# Patient Record
Sex: FEMALE | Race: WHITE | Employment: FULL TIME | URBAN - METROPOLITAN AREA
[De-identification: names, ages, dates, MRNs, and addresses within clinical notes are randomized per-mention and may not be internally consistent; named-entity substitution may affect disease eponyms.]

---

## 2018-09-26 ENCOUNTER — OFFICE VISIT (OUTPATIENT)
Dept: URGENT CARE | Age: 28
End: 2018-09-26

## 2018-09-26 VITALS
OXYGEN SATURATION: 98 % | HEIGHT: 64 IN | DIASTOLIC BLOOD PRESSURE: 66 MMHG | SYSTOLIC BLOOD PRESSURE: 119 MMHG | HEART RATE: 66 BPM | WEIGHT: 151 LBS | TEMPERATURE: 98.5 F | BODY MASS INDEX: 25.78 KG/M2 | RESPIRATION RATE: 18 BRPM

## 2018-09-26 DIAGNOSIS — L03.317 CELLULITIS OF BUTTOCK: Primary | ICD-10-CM

## 2018-09-26 RX ORDER — CLINDAMYCIN PHOSPHATE 150 MG/ML
300 INJECTION, SOLUTION INTRAVENOUS
Status: COMPLETED | OUTPATIENT
Start: 2018-09-26 | End: 2018-09-26

## 2018-09-26 RX ORDER — AMOXICILLIN AND CLAVULANATE POTASSIUM 875; 125 MG/1; MG/1
1 TABLET, FILM COATED ORAL 2 TIMES DAILY
Qty: 20 TAB | Refills: 0 | Status: SHIPPED | OUTPATIENT
Start: 2018-09-26 | End: 2018-09-28 | Stop reason: ALTCHOICE

## 2018-09-26 RX ADMIN — CLINDAMYCIN PHOSPHATE 300 MG: 150 INJECTION, SOLUTION INTRAVENOUS at 09:18

## 2018-09-26 NOTE — MR AVS SNAPSHOT
[facility] [address] [place] [address] 
[phone] Patient: Radha Suero MRN: [id number] :1990 Visit Information Date & Time Provider Department Dept. Phone Encounter #  
 2018  8:45 AM [facility] [phone] [id number] Upcoming Health Maintenance Date Due DTaP/Tdap/Td series (1 - Tdap) 10/29/2011 PAP AKA CERVICAL CYTOLOGY 10/29/2011 Influenza Age 5 to Adult 2018 Allergies as of 2018  Review Complete On: 2018 By: Kenya Valentin RN No Known Allergies Current Immunizations  Never Reviewed No immunizations on file. Not reviewed this visit You Were Diagnosed With   
  
 Codes Comments Cellulitis of buttock    -  Primary ICD-10-CM: L03.317 ICD-9-CM: 682.5 Vitals BP Pulse Temp Resp Height(growth percentile) Weight(growth percentile)  
 119/66 66 98.5 °F (36.9 °C) 18 5' 4\" (1.626 m) 151 lb (68.5 kg) LMP SpO2 BMI OB Status Smoking Status 2018 (Exact Date) 98% 25.92 kg/m2 Unknown Current Every Day Smoker BMI and BSA Data Body Mass Index Body Surface Area  
 25.92 kg/m 2 1.76 m 2 Preferred Pharmacy Pharmacy Name Phone [id number] [address] [address] [place] [address] [phone] Your Updated Medication List  
  
   
This list is accurate as of 18  9:11 AM.  Always use your most recent med list.  
  
  
  
  
 amoxicillin-clavulanate 875-125 mg per tablet Commonly known as:  AUGMENTIN Take 1 Tab by mouth two (2) times a day for 10 days. ATIVAN PO Take  by mouth. Prescriptions Sent to Pharmacy Refills  
 amoxicillin-clavulanate (AUGMENTIN) 875-125 mg per tablet 0 Sig: Take 1 Tab by mouth two (2) times a day for 10 days.   
 Class: Normal  
 Pharmacy: [id number] [place], [place] [employer]  #: [phone] Route: Oral  
  
Patient Instructions Cellulitis: Care Instructions Your Care Instructions Cellulitis is a skin infection caused by bacteria, most often strep or staph. It often occurs after a break in the skin from a scrape, cut, bite, or puncture, or after a rash. Cellulitis may be treated without doing tests to find out what caused it. But your doctor may do tests, if needed, to look for a specific bacteria, like methicillin-resistant Staphylococcus aureus (MRSA). The doctor has checked you carefully, but problems can develop later. If you notice any problems or new symptoms, get medical treatment right away. Follow-up care is a key part of your treatment and safety. Be sure to make and go to all appointments, and call your doctor if you are having problems. It's also a good idea to know your test results and keep a list of the medicines you take. How can you care for yourself at home? · Take your antibiotics as directed. Do not stop taking them just because you feel better. You need to take the full course of antibiotics. · Prop up the infected area on pillows to reduce pain and swelling. Try to keep the area above the level of your heart as often as you can. · If your doctor told you how to care for your wound, follow your doctor's instructions. If you did not get instructions, follow this general advice: ¨ Wash the wound with clean water 2 times a day. Don't use hydrogen peroxide or alcohol, which can slow healing. ¨ You may cover the wound with a thin layer of petroleum jelly, such as Vaseline, and a nonstick bandage. ¨ Apply more petroleum jelly and replace the bandage as needed. · Be safe with medicines. Take pain medicines exactly as directed. ¨ If the doctor gave you a prescription medicine for pain, take it as prescribed. ¨ If you are not taking a prescription pain medicine, ask your doctor if you can take an over-the-counter medicine. To prevent cellulitis in the future · Try to prevent cuts, scrapes, or other injuries to your skin. Cellulitis most often occurs where there is a break in the skin. · If you get a scrape, cut, mild burn, or bite, wash the wound with clean water as soon as you can to help avoid infection. Don't use hydrogen peroxide or alcohol, which can slow healing. · If you have swelling in your legs (edema), support stockings and good skin care may help prevent leg sores and cellulitis. · Take care of your feet, especially if you have diabetes or other conditions that increase the risk of infection. Wear shoes and socks. Do not go barefoot. If you have athlete's foot or other skin problems on your feet, talk to your doctor about how to treat them. When should you call for help? Call your doctor now or seek immediate medical care if: 
  · You have signs that your infection is getting worse, such as: 
¨ Increased pain, swelling, warmth, or redness. ¨ Red streaks leading from the area. ¨ Pus draining from the area. ¨ A fever.  
  · You get a rash.  
 Watch closely for changes in your health, and be sure to contact your doctor if: 
  · You do not get better as expected. Where can you learn more? Go to http://adarsh-juan miguel.info/. Phoenix Malik in the search box to learn more about \"Cellulitis: Care Instructions. \" Current as of: May 10, 2017 Content Version: 11.7 © 9142-4186 [employer]. Care instructions adapted under license by [employer] (which disclaims liability or warranty for this information). If you have questions about a medical condition or this instruction, always ask your healthcare professional. [place] [address] any warranty or liability for your use of this information. Introducing [facility]!    
 Virgil Jaimes introduces [employer] patient portal. Now you can access parts of your medical record, email your doctor's office, and request medication refills online. 1. In your internet browser, go to [email or web address] 2. Click on the First Time User? Click Here link in the Sign In box. You will see the New Member Sign Up page. 3. Enter your CollabNet Access Code exactly as it appears below. You will not need to use this code after youve completed the sign-up process. If you do not sign up before the expiration date, you must request a new code. · CollabNet Access Code: [email or web address] 
Expires: 12/25/2018  8:48 AM 
 
4. Enter the last four digits of your Social Security Number (xxxx) and Date of Birth (mm/dd/yyyy) as indicated and click Submit. You will be taken to the next sign-up page. 5. Create a CollabNet ID. This will be your CollabNet login ID and cannot be changed, so think of one that is secure and easy to remember. 6. Create a CollabNet password. You can change your password at any time. 7. Enter your Password Reset Question and Answer. This can be used at a later time if you forget your password. 8. Enter your e-mail address. You will receive e-mail notification when new information is available in [address] 9. Click Sign Up. You can now view and download portions of your medical record. 10. Click the Download Summary menu link to download a portable copy of your medical information. If you have questions, please visit the Frequently Asked Questions section of the CollabNet website. Remember, CollabNet is NOT to be used for urgent needs. For medical emergencies, dial 911. Now available from your iPhone and Android! Please provide this summary of care documentation to your next provider. If you have any questions after today's visit, please call [phone].

## 2018-09-26 NOTE — PATIENT INSTRUCTIONS
Cellulitis: Care Instructions  Your Care Instructions    Cellulitis is a skin infection caused by bacteria, most often strep or staph. It often occurs after a break in the skin from a scrape, cut, bite, or puncture, or after a rash. Cellulitis may be treated without doing tests to find out what caused it. But your doctor may do tests, if needed, to look for a specific bacteria, like methicillin-resistant Staphylococcus aureus (MRSA). The doctor has checked you carefully, but problems can develop later. If you notice any problems or new symptoms, get medical treatment right away. Follow-up care is a key part of your treatment and safety. Be sure to make and go to all appointments, and call your doctor if you are having problems. It's also a good idea to know your test results and keep a list of the medicines you take. How can you care for yourself at home? · Take your antibiotics as directed. Do not stop taking them just because you feel better. You need to take the full course of antibiotics. · Prop up the infected area on pillows to reduce pain and swelling. Try to keep the area above the level of your heart as often as you can. · If your doctor told you how to care for your wound, follow your doctor's instructions. If you did not get instructions, follow this general advice:  ¨ Wash the wound with clean water 2 times a day. Don't use hydrogen peroxide or alcohol, which can slow healing. ¨ You may cover the wound with a thin layer of petroleum jelly, such as Vaseline, and a nonstick bandage. ¨ Apply more petroleum jelly and replace the bandage as needed. · Be safe with medicines. Take pain medicines exactly as directed. ¨ If the doctor gave you a prescription medicine for pain, take it as prescribed. ¨ If you are not taking a prescription pain medicine, ask your doctor if you can take an over-the-counter medicine.   To prevent cellulitis in the future  · Try to prevent cuts, scrapes, or other injuries to your skin. Cellulitis most often occurs where there is a break in the skin. · If you get a scrape, cut, mild burn, or bite, wash the wound with clean water as soon as you can to help avoid infection. Don't use hydrogen peroxide or alcohol, which can slow healing. · If you have swelling in your legs (edema), support stockings and good skin care may help prevent leg sores and cellulitis. · Take care of your feet, especially if you have diabetes or other conditions that increase the risk of infection. Wear shoes and socks. Do not go barefoot. If you have athlete's foot or other skin problems on your feet, talk to your doctor about how to treat them. When should you call for help? Call your doctor now or seek immediate medical care if:    · You have signs that your infection is getting worse, such as:  ¨ Increased pain, swelling, warmth, or redness. ¨ Red streaks leading from the area. ¨ Pus draining from the area. ¨ A fever.     · You get a rash.    Watch closely for changes in your health, and be sure to contact your doctor if:    · You do not get better as expected. Where can you learn more? Go to http://adarsh-juan miguel.info/. Gina Richmond in the search box to learn more about \"Cellulitis: Care Instructions. \"  Current as of: May 10, 2017  Content Version: 11.7  © 3716-6374 [employer]. Care instructions adapted under license by [employer] (which disclaims liability or warranty for this information). If you have questions about a medical condition or this instruction, always ask your healthcare professional. [place] [address] any warranty or liability for your use of this information.

## 2018-09-26 NOTE — LETTER
[address] [place] [address] 
[phone] Work/School Note Date: 9/26/2018 To Whom It May concern: 
 
Mario Alberto Durant was seen and treated today in the urgent care center. Mario Alberto Durant may return to work on 9/27/2018. Sincerely, Zan Dillard MD

## 2018-09-26 NOTE — PROGRESS NOTES
Patient is a 32 y.o. female presenting with rash. Rash    The history is provided by the patient. This is a recurrent (hx of recurrent pilonidal infections, never had I&D) problem. Episode onset: 5 days ago. The problem has been gradually worsening. There has been no fever. Affected Location: upper crease of buttocks. The pain is at a severity of 4/10. The pain is mild. The pain has been constant since onset. Associated symptoms include pain. She has tried nothing for the symptoms. No past medical history on file. No past surgical history on file. No family history on file. Social History     Social History    Marital status: SINGLE     Spouse name: N/A    Number of children: N/A    Years of education: N/A     Occupational History    Not on file. Social History Main Topics    Smoking status: Current Every Day Smoker    Smokeless tobacco: Never Used    Alcohol use Not on file    Drug use: Not on file    Sexual activity: Not on file     Other Topics Concern    Not on file     Social History Narrative    No narrative on file                ALLERGIES: Review of patient's allergies indicates no known allergies. Review of Systems   Constitutional: Negative for chills and fever. Respiratory: Negative for shortness of breath and wheezing. Cardiovascular: Negative for chest pain and palpitations. Musculoskeletal: Negative for myalgias. Skin: Positive for color change and rash. Hematological: Negative for adenopathy. Vitals:    09/26/18 0901   BP: 119/66   Pulse: 66   Resp: 18   Temp: 98.5 °F (36.9 °C)   SpO2: 98%   Weight: 151 lb (68.5 kg)   Height: 5' 4\" (1.626 m)       Physical Exam   Constitutional: She appears well-developed and well-nourished. No distress. Neurological: She is alert. Skin: She is not diaphoretic. There is erythema. Psychiatric: She has a normal mood and affect.  Her behavior is normal. Judgment and thought content normal.   Nursing note and vitals reviewed. [facility]    ICD-10-CM ICD-9-CM    1. Cellulitis of buttock L03.317 682.5      Medications Ordered Today   Medications    clindamycin phosphate (CLEOCIN) 150 mg/mL injection 300 mg     Order Specific Question:   Antibiotic Indications     Answer:   Skin and Soft Tissue Infection     Order Specific Question:   Skin duration of therapy     Answer: Other     Comments:   now    amoxicillin-clavulanate (AUGMENTIN) 875-125 mg per tablet     Sig: Take 1 Tab by mouth two (2) times a day for 10 days. Dispense:  20 Tab     Refill:  0     Warm compresses    The patients condition was discussed with the patient and they understand. The patient is to follow up with PCP. If signs and symptoms become worse the pt is to go to the ER. The patient is to take medications as prescribed.              Procedures

## 2018-09-28 ENCOUNTER — OFFICE VISIT (OUTPATIENT)
Dept: URGENT CARE | Age: 28
End: 2018-09-28

## 2018-09-28 VITALS
SYSTOLIC BLOOD PRESSURE: 106 MMHG | DIASTOLIC BLOOD PRESSURE: 65 MMHG | OXYGEN SATURATION: 98 % | BODY MASS INDEX: 25.78 KG/M2 | HEART RATE: 70 BPM | RESPIRATION RATE: 16 BRPM | HEIGHT: 64 IN | TEMPERATURE: 97.3 F | WEIGHT: 151 LBS

## 2018-09-28 DIAGNOSIS — L05.01 PILONIDAL CYST WITH ABSCESS: Primary | ICD-10-CM

## 2018-09-28 RX ORDER — CLINDAMYCIN HYDROCHLORIDE 300 MG/1
300 CAPSULE ORAL 3 TIMES DAILY
Qty: 30 CAP | Refills: 0 | Status: SHIPPED | OUTPATIENT
Start: 2018-09-28 | End: 2018-10-08

## 2018-09-28 NOTE — PROGRESS NOTES
HPI Comments: Werner Schneider presents with worsening buttock redness, pain, and swelling for the past 7 days. Was seen here 2 days ago, placed on Augmentin for buttock cellulitis without improvement. Denies fever, chills, drainage. Has hx of recurrent pilonidal infections, that typically self resolve. Sister also has them. The history is provided by the patient. History reviewed. No pertinent past medical history. History reviewed. No pertinent surgical history. History reviewed. No pertinent family history. Social History Social History  Marital status: SINGLE Spouse name: N/A  
 Number of children: N/A  
 Years of education: N/A Occupational History  Not on file. Social History Main Topics  Smoking status: Current Every Day Smoker  Smokeless tobacco: Never Used  Alcohol use Not on file  Drug use: Not on file  Sexual activity: Not on file Other Topics Concern  Not on file Social History Narrative ALLERGIES: Review of patient's allergies indicates no known allergies. Review of Systems Constitutional: Negative for chills and fever. Respiratory: Negative for shortness of breath and wheezing. Cardiovascular: Negative for chest pain and palpitations. Musculoskeletal: Negative for myalgias. Skin: Positive for color change. Hematological: Negative for adenopathy. Vitals:  
 09/28/18 2688 BP: 106/65 Pulse: 70 Resp: 16 Temp: 97.3 °F (36.3 °C) SpO2: 98% Weight: 151 lb (68.5 kg) Height: 5' 4\" (1.626 m) Physical Exam  
Constitutional: She appears well-developed and well-nourished. No distress. Neurological: She is alert. Skin: She is not diaphoretic. There is erythema. Psychiatric: She has a normal mood and affect. Her behavior is normal. Judgment and thought content normal.  
Nursing note and vitals reviewed. MDM 
  ICD-10-CM ICD-9-CM 1. Pilonidal cyst with abscess L05.01 685.0 AEROBIC BACTERIAL CULTURE Medications Ordered Today Medications  clindamycin (CLEOCIN) 300 mg capsule Sig: Take 1 Cap by mouth three (3) times daily for 10 days. Dispense:  30 Cap Refill:  0 Warm compresses The patients condition was discussed with the patient and they understand. The patient is to follow up with PCP. If signs and symptoms become worse the pt is to go to the ER. The patient is to take medications as prescribed. I&D Abcess Simple Consent: Verbal consent obtained. Consent given by: patient Date/Time: 9/28/2018 10:09 AM 
Performed by: attendingPreparation: skin prepped with alcohol Location details: right buttocks Anesthesia: local infiltration Anesthesia: 
Local Anesthetic: lidocaine 1% with epinephrine Anesthetic total: 1 mL Scalpel size: 11 Complexity: simple Drainage: purulent Drainage amount: scant Wound treatment: wound left open Post-procedure: dressing applied Patient tolerance: Patient tolerated the procedure well with no immediate complications My total time at bedside, performing this procedure was 1-15 minutes.

## 2018-09-28 NOTE — PATIENT INSTRUCTIONS
Pilonidal Abscess: Care Instructions Your Care Instructions A pilonidal abscess is an infection caused by an ingrown hair. The abscess occurs in the area of the tailbone and the top of the buttocks. The infection causes a pocket of pus to form. It can be quite painful. Your doctor may have opened and drained the abscess. You can take care of yourself at home to help the area heal. In some cases, the abscess returns. Your doctor may suggest surgery to remove the site of the infection if it comes back. You may have had a sedative to help you relax. You may be unsteady after having sedation. It can take a few hours for the medicine's effects to wear off. Common side effects of sedation include nausea, vomiting, and feeling sleepy or tired. The doctor has checked you carefully, but problems can develop later. If you notice any problems or new symptoms, get medical treatment right away. Follow-up care is a key part of your treatment and safety. Be sure to make and go to all appointments, and call your doctor if you are having problems. It's also a good idea to know your test results and keep a list of the medicines you take. How can you care for yourself at home? · If the doctor gave you a sedative: ¨ For 24 hours, don't do anything that requires attention to detail. It takes time for the medicine's effects to completely wear off. ¨ For your safety, do not drive or operate any machinery that could be dangerous. Wait until the medicine wears off and you can think clearly and react easily. · If your doctor prescribed antibiotics, take them exactly as directed. Do not stop taking them just because you feel better. You need to take the full course of antibiotics. · Be safe with medicines. Take pain medicines exactly as directed. ¨ If the doctor gave you a prescription medicine for pain, take it as prescribed.  
¨ If you are not taking a prescription pain medicine, ask your doctor if you can take an over-the-counter medicine. · If your doctor opened and drained your abscess, you may have gauze or other packing material inside your wound. Follow all instructions from your doctor on how to care for your wound. · Keep the area of your wound very clean. Use wet cotton balls, a warm washcloth, or baby wipes. Clean the area gently, especially after a bowel movement. When should you call for help? Call 911 anytime you think you may need emergency care. For example, call if: 
  · You have trouble breathing.  
  · You passed out (lost consciousness).  
 Call your doctor now or seek immediate medical care if: 
  · You have new or worse nausea or vomiting.  
  · You have symptoms of infection, such as: 
¨ Increased pain, swelling, warmth, or redness. ¨ Red streaks leading from the area. ¨ Pus draining from the area. ¨ A fever.  
 Watch closely for changes in your health, and be sure to contact your doctor if: 
  · You do not get better as expected. Where can you learn more? Go to http://adarsh-juan miguel.info/. Enter 22 [id number] in the search box to learn more about \"Pilonidal Abscess: Care Instructions. \" Current as of: October 5, 2017 Content Version: 11.7 © 4646-7126 [employer]. Care instructions adapted under license by [employer] (which disclaims liability or warranty for this information). If you have questions about a medical condition or this instruction, always ask your healthcare professional. [place] [address] any warranty or liability for your use of this information.

## 2018-09-28 NOTE — MR AVS SNAPSHOT
[address] [facility] [address] 
[phone] Patient: Jasmyn Lambert MRN: [id number] :1990 Visit Information Date & Time Provider Department Dept. Phone Encounter #  
 2018  9:00 AM [address] [facility] [phone] [id number] Upcoming Health Maintenance Date Due Pneumococcal 19-64 Medium Risk (1 of 1 - PPSV23) 10/29/2009 DTaP/Tdap/Td series (1 - Tdap) 10/29/2011 PAP AKA CERVICAL CYTOLOGY 10/29/2011 Influenza Age 5 to Adult 2018 Allergies as of 2018  Review Complete On: 2018 By: Ida Jhaveri RN No Known Allergies Current Immunizations  Never Reviewed No immunizations on file. Not reviewed this visit You Were Diagnosed With   
  
 Codes Comments Pilonidal cyst with abscess    -  Primary ICD-10-CM: L05.01 
ICD-9-CM: 385. 0 Vitals BP Pulse Temp Resp Height(growth percentile) Weight(growth percentile) 106/65 70 97.3 °F (36.3 °C) 16 5' 4\" (1.626 m) 151 lb (68.5 kg) SpO2 BMI OB Status Smoking Status 98% 25.92 kg/m2 Unknown Current Every Day Smoker BMI and BSA Data Body Mass Index Body Surface Area  
 25.92 kg/m 2 1.76 m 2 Preferred Pharmacy Pharmacy Name Phone [facility]/PHARMACY #[id number] [place] [address] [phone] Your Updated Medication List  
  
   
This list is accurate as of 18  9:47 AM.  Always use your most recent med list.  
  
  
  
  
 ATIVAN PO Take  by mouth. clindamycin 300 mg capsule Commonly known as:  CLEOCIN Take 1 Cap by mouth three (3) times daily for 10 days. Prescriptions Sent to Pharmacy Refills  
 clindamycin (CLEOCIN) 300 mg capsule 0 Sig: Take 1 Cap by mouth three (3) times daily for 10 days.   
 Class: Normal  
 Pharmacy: [facility]/pharmacy #[id number] [place] [place] [employer]  #: [phone] Route: Oral  
  
We Performed the Following AEROBIC BACTERIAL CULTURE [id number] CPT(R)] Patient Instructions Pilonidal Abscess: Care Instructions Your Care Instructions A pilonidal abscess is an infection caused by an ingrown hair. The abscess occurs in the area of the tailbone and the top of the buttocks. The infection causes a pocket of pus to form. It can be quite painful. Your doctor may have opened and drained the abscess. You can take care of yourself at home to help the area heal. In some cases, the abscess returns. Your doctor may suggest surgery to remove the site of the infection if it comes back. You may have had a sedative to help you relax. You may be unsteady after having sedation. It can take a few hours for the medicine's effects to wear off. Common side effects of sedation include nausea, vomiting, and feeling sleepy or tired. The doctor has checked you carefully, but problems can develop later. If you notice any problems or new symptoms, get medical treatment right away. Follow-up care is a key part of your treatment and safety. Be sure to make and go to all appointments, and call your doctor if you are having problems. It's also a good idea to know your test results and keep a list of the medicines you take. How can you care for yourself at home? · If the doctor gave you a sedative: ¨ For 24 hours, don't do anything that requires attention to detail. It takes time for the medicine's effects to completely wear off. ¨ For your safety, do not drive or operate any machinery that could be dangerous. Wait until the medicine wears off and you can think clearly and react easily. · If your doctor prescribed antibiotics, take them exactly as directed. Do not stop taking them just because you feel better. You need to take the full course of antibiotics. · Be safe with medicines. Take pain medicines exactly as directed. ¨ If the doctor gave you a prescription medicine for pain, take it as prescribed. ¨ If you are not taking a prescription pain medicine, ask your doctor if you can take an over-the-counter medicine. · If your doctor opened and drained your abscess, you may have gauze or other packing material inside your wound. Follow all instructions from your doctor on how to care for your wound. · Keep the area of your wound very clean. Use wet cotton balls, a warm washcloth, or baby wipes. Clean the area gently, especially after a bowel movement. When should you call for help? Call 911 anytime you think you may need emergency care. For example, call if: 
  · You have trouble breathing.  
  · You passed out (lost consciousness).  
 Call your doctor now or seek immediate medical care if: 
  · You have new or worse nausea or vomiting.  
  · You have symptoms of infection, such as: 
¨ Increased pain, swelling, warmth, or redness. ¨ Red streaks leading from the area. ¨ Pus draining from the area. ¨ A fever.  
 Watch closely for changes in your health, and be sure to contact your doctor if: 
  · You do not get better as expected. Where can you learn more? Go to http://adarsh-juan miguel.info/. Enter 22 [id number] in the search box to learn more about \"Pilonidal Abscess: Care Instructions. \" Current as of: October 5, 2017 Content Version: 11.7 © 4799-4210 [employer]. Care instructions adapted under license by [employer] (which disclaims liability or warranty for this information). If you have questions about a medical condition or this instruction, always ask your healthcare professional. [place] [address] any warranty or liability for your use of this information. Introducing hospitals & HEALTH SERVICES! [facility] introduces [employer] patient portal. Now you can access parts of your medical record, email your doctor's office, and request medication refills online. 1. In your internet browser, go to [email or web address] 2. Click on the First Time User? Click Here link in the Sign In box. You will see the New Member Sign Up page. 3. Enter your [employer] Access Code exactly as it appears below. You will not need to use this code after youve completed the sign-up process. If you do not sign up before the expiration date, you must request a new code. · [employer] Access Code: [email or web address] 
Expires: 12/25/2018  8:48 AM 
 
4. Enter the last four digits of your Social Security Number (xxxx) and Date of Birth (mm/dd/yyyy) as indicated and click Submit. You will be taken to the next sign-up page. 5. Create a Think Good [employer] ID. This will be your [employer] login ID and cannot be changed, so think of one that is secure and easy to remember. 6. Create a [employer] password. You can change your password at any time. 7. Enter your Password Reset Question and Answer. This can be used at a later time if you forget your password. 8. Enter your e-mail address. You will receive e-mail notification when new information is available in [address] 9. Click Sign Up. You can now view and download portions of your medical record. 10. Click the Download Summary menu link to download a portable copy of your medical information. If you have questions, please visit the Frequently Asked Questions section of the [employer] website. Remember, [employer] is NOT to be used for urgent needs. For medical emergencies, dial 911. Now available from your iPhone and Android! Please provide this summary of care documentation to your next provider. If you have any questions after today's visit, please call [phone].

## 2018-10-01 LAB — BACTERIA SPEC AEROBE CULT: NORMAL

## 2019-03-10 ENCOUNTER — HOSPITAL ENCOUNTER (INPATIENT)
Age: 29
LOS: 2 days | Discharge: HOME | DRG: 603 | End: 2019-03-12
Attending: EMERGENCY MEDICINE | Admitting: HOSPITALIST
Payer: COMMERCIAL

## 2019-03-10 ENCOUNTER — APPOINTMENT (OUTPATIENT)
Dept: GENERAL RADIOLOGY | Age: 29
DRG: 603 | End: 2019-03-10
Attending: PHYSICIAN ASSISTANT
Payer: COMMERCIAL
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
## 2019-03-10 DIAGNOSIS — L08.9 SKIN INFECTION: ICD-10-CM

## 2019-03-10 PROBLEM — L03.90 CELLULITIS: Status: ACTIVE | Noted: 2019-03-10

## 2019-03-10 LAB
ANION GAP SERPL CALC-SCNC: 7 MMOL/L (ref 5–15)
BASOPHILS # BLD: 0 K/UL (ref 0–0.1)
BASOPHILS NFR BLD: 0 % (ref 0–1)
BUN SERPL-MCNC: 9 MG/DL (ref 6–20)
BUN/CREAT SERPL: 11 (ref 12–20)
CALCIUM SERPL-MCNC: 9.9 MG/DL (ref 8.5–10.1)
CHLORIDE SERPL-SCNC: 107 MMOL/L (ref 97–108)
CO2 SERPL-SCNC: 22 MMOL/L (ref 21–32)
CREAT SERPL-MCNC: 0.81 MG/DL (ref 0.55–1.02)
CRP SERPL HS-MCNC: >9.5 MG/L
DIFFERENTIAL METHOD BLD: NORMAL
EOSINOPHIL # BLD: 0.1 K/UL (ref 0–0.4)
EOSINOPHIL NFR BLD: 1 % (ref 0–7)
ERYTHROCYTE [DISTWIDTH] IN BLOOD BY AUTOMATED COUNT: 12.5 % (ref 11.5–14.5)
ERYTHROCYTE [SEDIMENTATION RATE] IN BLOOD: 22 MM/HR (ref 0–20)
GLUCOSE SERPL-MCNC: 75 MG/DL (ref 65–100)
HCT VFR BLD AUTO: 39.9 % (ref 35–47)
HGB BLD-MCNC: 13.1 G/DL (ref 11.5–16)
IMM GRANULOCYTES # BLD AUTO: 0 K/UL (ref 0–0.04)
IMM GRANULOCYTES NFR BLD AUTO: 0 % (ref 0–0.5)
LACTATE SERPL-SCNC: 1.2 MMOL/L (ref 0.4–2)
LYMPHOCYTES # BLD: 1.3 K/UL (ref 0.8–3.5)
LYMPHOCYTES NFR BLD: 18 % (ref 12–49)
MCH RBC QN AUTO: 29.8 PG (ref 26–34)
MCHC RBC AUTO-ENTMCNC: 32.8 G/DL (ref 30–36.5)
MCV RBC AUTO: 90.9 FL (ref 80–99)
MONOCYTES # BLD: 0.7 K/UL (ref 0–1)
MONOCYTES NFR BLD: 10 % (ref 5–13)
NEUTS SEG # BLD: 4.8 K/UL (ref 1.8–8)
NEUTS SEG NFR BLD: 71 % (ref 32–75)
NRBC # BLD: 0 K/UL (ref 0–0.01)
NRBC BLD-RTO: 0 PER 100 WBC
PLATELET # BLD AUTO: 256 K/UL (ref 150–400)
PMV BLD AUTO: 9.4 FL (ref 8.9–12.9)
POTASSIUM SERPL-SCNC: 5.6 MMOL/L (ref 3.5–5.1)
RBC # BLD AUTO: 4.39 M/UL (ref 3.8–5.2)
SODIUM SERPL-SCNC: 136 MMOL/L (ref 136–145)
WBC # BLD AUTO: 6.9 K/UL (ref 3.6–11)

## 2019-03-10 PROCEDURE — 86141 C-REACTIVE PROTEIN HS: CPT

## 2019-03-10 PROCEDURE — 99283 EMERGENCY DEPT VISIT LOW MDM: CPT

## 2019-03-10 PROCEDURE — 36415 COLL VENOUS BLD VENIPUNCTURE: CPT

## 2019-03-10 PROCEDURE — 96374 THER/PROPH/DIAG INJ IV PUSH: CPT

## 2019-03-10 PROCEDURE — 87040 BLOOD CULTURE FOR BACTERIA: CPT

## 2019-03-10 PROCEDURE — 85652 RBC SED RATE AUTOMATED: CPT

## 2019-03-10 PROCEDURE — 74011250636 HC RX REV CODE- 250/636: Performed by: HOSPITALIST

## 2019-03-10 PROCEDURE — 90715 TDAP VACCINE 7 YRS/> IM: CPT | Performed by: PHYSICIAN ASSISTANT

## 2019-03-10 PROCEDURE — 85025 COMPLETE CBC W/AUTO DIFF WBC: CPT

## 2019-03-10 PROCEDURE — 74011000258 HC RX REV CODE- 258: Performed by: PHYSICIAN ASSISTANT

## 2019-03-10 PROCEDURE — 65270000029 HC RM PRIVATE

## 2019-03-10 PROCEDURE — 74011250637 HC RX REV CODE- 250/637: Performed by: HOSPITALIST

## 2019-03-10 PROCEDURE — 74011250636 HC RX REV CODE- 250/636: Performed by: PHYSICIAN ASSISTANT

## 2019-03-10 PROCEDURE — 73110 X-RAY EXAM OF WRIST: CPT

## 2019-03-10 PROCEDURE — 74011000258 HC RX REV CODE- 258: Performed by: HOSPITALIST

## 2019-03-10 PROCEDURE — 83605 ASSAY OF LACTIC ACID: CPT

## 2019-03-10 PROCEDURE — 80048 BASIC METABOLIC PNL TOTAL CA: CPT

## 2019-03-10 RX ORDER — TRAMADOL HYDROCHLORIDE 50 MG/1
50 TABLET ORAL
Status: DISCONTINUED | OUTPATIENT
Start: 2019-03-10 | End: 2019-03-12 | Stop reason: HOSPADM

## 2019-03-10 RX ORDER — ACETAMINOPHEN 325 MG/1
650 TABLET ORAL
Status: DISCONTINUED | OUTPATIENT
Start: 2019-03-10 | End: 2019-03-12 | Stop reason: HOSPADM

## 2019-03-10 RX ORDER — VANCOMYCIN/0.9 % SOD CHLORIDE 1.5G/250ML
1500 PLASTIC BAG, INJECTION (ML) INTRAVENOUS
Status: COMPLETED | OUTPATIENT
Start: 2019-03-10 | End: 2019-03-10

## 2019-03-10 RX ORDER — KETOROLAC TROMETHAMINE 30 MG/ML
15 INJECTION, SOLUTION INTRAMUSCULAR; INTRAVENOUS
Status: COMPLETED | OUTPATIENT
Start: 2019-03-10 | End: 2019-03-10

## 2019-03-10 RX ORDER — ONDANSETRON 2 MG/ML
4 INJECTION INTRAMUSCULAR; INTRAVENOUS
Status: DISCONTINUED | OUTPATIENT
Start: 2019-03-10 | End: 2019-03-12 | Stop reason: HOSPADM

## 2019-03-10 RX ORDER — SODIUM CHLORIDE 0.9 % (FLUSH) 0.9 %
5-40 SYRINGE (ML) INJECTION AS NEEDED
Status: DISCONTINUED | OUTPATIENT
Start: 2019-03-10 | End: 2019-03-12 | Stop reason: HOSPADM

## 2019-03-10 RX ORDER — SODIUM CHLORIDE 0.9 % (FLUSH) 0.9 %
5-40 SYRINGE (ML) INJECTION EVERY 8 HOURS
Status: DISCONTINUED | OUTPATIENT
Start: 2019-03-10 | End: 2019-03-12 | Stop reason: HOSPADM

## 2019-03-10 RX ORDER — DIPHENHYDRAMINE HCL 25 MG
25 CAPSULE ORAL
Status: DISCONTINUED | OUTPATIENT
Start: 2019-03-10 | End: 2019-03-12 | Stop reason: HOSPADM

## 2019-03-10 RX ORDER — KETOROLAC TROMETHAMINE 30 MG/ML
30 INJECTION, SOLUTION INTRAMUSCULAR; INTRAVENOUS
Status: DISCONTINUED | OUTPATIENT
Start: 2019-03-10 | End: 2019-03-12 | Stop reason: HOSPADM

## 2019-03-10 RX ORDER — SERTRALINE HYDROCHLORIDE 50 MG/1
50 TABLET, FILM COATED ORAL DAILY
Status: DISCONTINUED | OUTPATIENT
Start: 2019-03-11 | End: 2019-03-12 | Stop reason: HOSPADM

## 2019-03-10 RX ADMIN — Medication 10 ML: at 20:13

## 2019-03-10 RX ADMIN — Medication 1 CAPSULE: at 18:21

## 2019-03-10 RX ADMIN — KETOROLAC TROMETHAMINE 15 MG: 60 INJECTION, SOLUTION INTRAMUSCULAR at 11:35

## 2019-03-10 RX ADMIN — SODIUM CHLORIDE 3 G: 900 INJECTION, SOLUTION INTRAVENOUS at 20:19

## 2019-03-10 RX ADMIN — SODIUM CHLORIDE 1000 ML: 900 INJECTION, SOLUTION INTRAVENOUS at 13:08

## 2019-03-10 RX ADMIN — PIPERACILLIN SODIUM,TAZOBACTAM SODIUM 3.38 G: 3; .375 INJECTION, POWDER, FOR SOLUTION INTRAVENOUS at 14:13

## 2019-03-10 RX ADMIN — KETOROLAC TROMETHAMINE 30 MG: 30 INJECTION, SOLUTION INTRAMUSCULAR; INTRAVENOUS at 20:12

## 2019-03-10 RX ADMIN — VANCOMYCIN HYDROCHLORIDE 1500 MG: 10 INJECTION, POWDER, LYOPHILIZED, FOR SOLUTION INTRAVENOUS at 18:21

## 2019-03-10 RX ADMIN — SODIUM CHLORIDE 3 G: 900 INJECTION, SOLUTION INTRAVENOUS at 16:05

## 2019-03-10 RX ADMIN — TETANUS TOXOID, REDUCED DIPHTHERIA TOXOID AND ACELLULAR PERTUSSIS VACCINE, ADSORBED 0.5 ML: 5; 2.5; 8; 8; 2.5 SUSPENSION INTRAMUSCULAR at 13:33

## 2019-03-10 NOTE — H&P
History & Physical    Primary Care Provider: Genet, MD Bartolo  Source of Information: Patient     History of Presenting Illness:   Amber Bella is a 29 y.o. female who presents with right hand swelling and pain     29 y.o. female with no significant past medical history presents with complaints of right wrist pain pain x 2 days. The pt states that she was bit by a dog 2 days ago. She was seen at [facility] 2 days ago and started on Augmentin. The pt states that this morning she has red streaking down her wrist and is unable to extend her wrist and difficulty to make a fist.  The pt rates the pain as a 5/10 in severity. The pt describes the pain as a dull ache. The pt denies taking anything at home for the discomfort.           The patient denies any fever, chills, chest pain, cough, congestion, recent illness, palpitations, or dysuria. Review of Systems:  General: no changes of weight, appetite or sleep. HEENT: no headache, no vision changes, no nose discharge, no hearing changes   RES: no wheezing, no cough, no sob  CVS: no cp, no palpitation. Muscular:hpi,   Skin: no rash, no itching   GI: no vomiting, no diarrhea  : no dysuria, no hematuria  Hemo: no gum bleeding, no petechial   Neuro: no sensation changes, no focal weakness   Endo: no polydipsia   Psych: denied depression     History reviewed. No pertinent past medical history. History reviewed. No pertinent surgical history. Prior to Admission medications    Medication Sig Start Date End Date Taking? Authorizing Provider   lorazepam (ATIVAN PO) Take  by mouth. Provider, Historical     No Known Allergies   History reviewed. No pertinent family history.      SOCIAL HISTORY:  Patient resides:  Independently x   Assisted Living    SNF    With family care       Smoking history: 7 cigs a day   None    Former    Chronic x     Alcohol history:   None    Social x   Chronic      Ambulates:   Independently x   w/cane w/walker    w/wc    CODE STATUS:  DNR    Full x   Other      Objective:     Physical Exam:     Visit Vitals  /67   Pulse 97   Temp 98.6 °F (37 °C)   Resp 18   Wt 68.5 kg (151 lb)   SpO2 100%   BMI 25.92 kg/m²      O2 Device: Room air    General:  Alert, cooperative, no distress, appears stated age. Head:  Normocephalic, without obvious abnormality, atraumatic. Eyes:  Conjunctivae/corneas clear. PERRL, EOMs intact. Nose: Nares normal. Septum midline. Mucosa normal. No drainage or sinus tenderness. Throat: Lips, mucosa, and tongue normal. Teeth and gums normal.   Neck: Supple, symmetrical, trachea midline, no adenopathy, thyroid: no enlargement/tenderness/nodules, no carotid bruit and no JVD. Back:   Symmetric, no curvature. ROM normal. No CVA tenderness. Lungs:   Clear to auscultation bilaterally. Chest wall:  No tenderness or deformity. Heart:  Regular rate and rhythm, S1, S2 normal, no murmur, click, rub or gallop. Abdomen:   Soft, non-tender. Bowel sounds normal. No masses,  No organomegaly. Extremities: Right wrist bit marks, with wound and redness, extended to forearm, pics    Pulses: 2+ and symmetric all extremities. Skin: Skin color, texture, turgor normal. No rashes or lesions   Neurologic: CNII-XII intact. No deficits. Data Review:     Recent Days:  Recent Labs     03/10/19  1153   WBC 6.9   HGB 13.1   HCT 39.9        Recent Labs     03/10/19  1153      K 5.6*      CO2 22   GLU 75   BUN 9   CREA 0.81   CA 9.9     No results for input(s): PH, PCO2, PO2, HCO3, FIO2 in the last 72 hours.     24 Hour Results:  Recent Results (from the past 24 hour(s))   CBC WITH AUTOMATED DIFF    Collection Time: 03/10/19 11:53 AM   Result Value Ref Range    WBC 6.9 3.6 - 11.0 K/uL    RBC 4.39 3.80 - 5.20 M/uL    HGB 13.1 11.5 - 16.0 g/dL    HCT 39.9 35.0 - 47.0 %    MCV 90.9 80.0 - 99.0 FL    MCH 29.8 26.0 - 34.0 PG    MCHC 32.8 30.0 - 36.5 g/dL    RDW 12.5 11.5 - 14.5 %    PLATELET 552 858 - 165 K/uL    MPV 9.4 8.9 - 12.9 FL    NRBC 0.0 0  WBC    ABSOLUTE NRBC 0.00 0.00 - 0.01 K/uL    NEUTROPHILS 71 32 - 75 %    LYMPHOCYTES 18 12 - 49 %    MONOCYTES 10 5 - 13 %    EOSINOPHILS 1 0 - 7 %    BASOPHILS 0 0 - 1 %    IMMATURE GRANULOCYTES 0 0.0 - 0.5 %    ABS. NEUTROPHILS 4.8 1.8 - 8.0 K/UL    ABS. LYMPHOCYTES 1.3 0.8 - 3.5 K/UL    ABS. MONOCYTES 0.7 0.0 - 1.0 K/UL    ABS. EOSINOPHILS 0.1 0.0 - 0.4 K/UL    ABS. BASOPHILS 0.0 0.0 - 0.1 K/UL    ABS. IMM. GRANS. 0.0 0.00 - 0.04 K/UL    DF AUTOMATED     METABOLIC PANEL, BASIC    Collection Time: 03/10/19 11:53 AM   Result Value Ref Range    Sodium 136 136 - 145 mmol/L    Potassium 5.6 (H) 3.5 - 5.1 mmol/L    Chloride 107 97 - 108 mmol/L    CO2 22 21 - 32 mmol/L    Anion gap 7 5 - 15 mmol/L    Glucose 75 65 - 100 mg/dL    BUN 9 6 - 20 MG/DL    Creatinine 0.81 0.55 - 1.02 MG/DL    BUN/Creatinine ratio 11 (L) 12 - 20      GFR est AA >60 >60 ml/min/1.73m2    GFR est non-AA >60 >60 ml/min/1.73m2    Calcium 9.9 8.5 - 10.1 MG/DL   SED RATE (ESR)    Collection Time: 03/10/19 11:53 AM   Result Value Ref Range    Sed rate, automated 22 (H) 0 - 20 mm/hr   CRP, HIGH SENSITIVITY    Collection Time: 03/10/19 11:53 AM   Result Value Ref Range    CRP, High sensitivity >9.5 mg/L   LACTIC ACID    Collection Time: 03/10/19 11:55 AM   Result Value Ref Range    Lactic acid 1.2 0.4 - 2.0 MMOL/L         Imaging:     Assessment:     Active Problems:    Cellulitis (3/10/2019)           Plan:     1. Right hand/forearm cellulitis: post dog bite. No improvement/worse after 2 days of oral abx. Will admit with IV Unasyn. Prn Toradol for severe pain. If still no improvement, consider add po doxy. 2. H/o depression: stable, continue home zoloft.         Signed By: Thea Bernardo MD     March 10, 2019

## 2019-03-10 NOTE — PROGRESS NOTES
Primary Nurse Madi Ulloa and Celestino Ambrose RN performed a dual skin assessment on this patient R hand red and swollen - tattoo  Arcadio score is 22

## 2019-03-10 NOTE — ED PROVIDER NOTES
29 y.o. female with no significant past medical history presents with complaints of right wrist pain pain x 2 days. The pt states that she was bit by a dog 2 days ago. She was seen at [facility] 2 days ago and started on Augmentin. The pt states that this morning she has red streaking down her wrist and is unable to extend her wrist.  The pt rates the pain as a 5/10 in severity. The pt describes the pain as a dull ache. The pt denies taking anything at home for the discomfort. There are no other acute medical complaints at this time. PCP: No primary care provider on file. Jie Adam PA-C             History reviewed. No pertinent past medical history. History reviewed. No pertinent surgical history. History reviewed. No pertinent family history. Social History     Socioeconomic History    Marital status: SINGLE     Spouse name: Not on file    Number of children: Not on file    Years of education: Not on file    Highest education level: Not on file   Social Needs    Financial resource strain: Not on file    Food insecurity - worry: Not on file    Food insecurity - inability: Not on file    Transportation needs - medical: Not on file   [place] needs - non-medical: Not on file   Occupational History    Not on file   Tobacco Use    Smoking status: Current Every Day Smoker    Smokeless tobacco: Never Used   Substance and Sexual Activity    Alcohol use: Not on file    Drug use: Not on file    Sexual activity: Not on file   Other Topics Concern    Not on file   Social History Narrative    Not on file         ALLERGIES: Patient has no known allergies. Review of Systems   Constitutional: Negative for chills, diaphoresis and fever. HENT: Negative for congestion, postnasal drip, rhinorrhea and sore throat. Eyes: Negative for photophobia, discharge, redness and visual disturbance. Respiratory: Negative for cough, chest tightness, shortness of breath and wheezing. Cardiovascular: Negative for chest pain, palpitations and leg swelling. Gastrointestinal: Negative for abdominal distention, abdominal pain, blood in stool, constipation, diarrhea, nausea and vomiting. Genitourinary: Negative for difficulty urinating, dysuria, frequency, hematuria and urgency. Musculoskeletal: Negative for arthralgias, back pain, joint swelling and myalgias. Skin: Positive for rash and wound. Negative for color change. Neurological: Negative for dizziness, speech difficulty, weakness, light-headedness, numbness and headaches. Psychiatric/Behavioral: Negative for confusion. The patient is not nervous/anxious. All other systems reviewed and are negative. Vitals:    03/10/19 1130   BP: 123/90   Pulse: 97   Resp: 18   Temp: 98.6 °F (37 °C)   SpO2: 98%   Weight: 68.5 kg (151 lb)            Physical Exam   Constitutional: She is oriented to person, place, and time. She appears well-developed and well-nourished. No distress. HENT:   Head: Normocephalic and atraumatic. Head is without raccoon's eyes, without Hannah's sign and without laceration. Right Ear: Hearing, tympanic membrane, external ear and ear canal normal. No foreign bodies. Tympanic membrane is not bulging. No hemotympanum. Left Ear: Hearing, tympanic membrane, external ear and ear canal normal. No foreign bodies. Tympanic membrane is not bulging. No hemotympanum. Nose: Nose normal. No mucosal edema or rhinorrhea. Right sinus exhibits no maxillary sinus tenderness and no frontal sinus tenderness. Left sinus exhibits no maxillary sinus tenderness and no frontal sinus tenderness. Mouth/Throat: Uvula is midline, oropharynx is clear and moist and mucous membranes are normal. No tonsillar abscesses. Eyes: Conjunctivae and EOM are normal. Pupils are equal, round, and reactive to light. Right eye exhibits no discharge. Left eye exhibits no discharge. Neck: Normal range of motion. Neck supple.    Cardiovascular: Normal rate, regular rhythm and normal heart sounds. Exam reveals no gallop and no friction rub. No murmur heard. Regular rate and rhythm. No murmurs, gallops, rubs, or clicks. Pulmonary/Chest: Effort normal and breath sounds normal. No respiratory distress. She has no wheezes. She has no rales. No stridor or wheezes. No accessory muscle usage. No nasal flaring. Breath Sounds equal bilaterally. Musculoskeletal: She exhibits edema and tenderness. She exhibits no deformity. Redness and warmth to the volar aspect of the wrist wrist.  + TTP along the wrist.  DROM at the right wrist.  Pt is NVI. Neurological: She is alert and oriented to person, place, and time. Skin: She is not diaphoretic. Nursing note and vitals reviewed. MDM  Number of Diagnoses or Management Options  Dog bite, initial encounter:   Skin infection:   Diagnosis management comments: Pt afebrile and nontoxic appearing. Pt on augmentin with worsening symptoms. Spoke to hospitalist and requested the pt be admitted for IV abx.   Abner Bernal PA-C         Procedures

## 2019-03-10 NOTE — ED TRIAGE NOTES
TRIAGE: Pt arrives with c/o an infection of her wrist d/t a dog bite. Pt denies fever, chills. Has been taking Augmentin for 2 days.

## 2019-03-10 NOTE — ROUTINE PROCESS
TRANSFER - OUT REPORT:    Verbal report given to Alesha Wilson RN (name) on Lori Masterson  being transferred to [facility](unit) for routine progression of care       Report consisted of patients Situation, Background, Assessment and   Recommendations(SBAR). Information from the following report(s) SBAR, ED Summary and MAR was reviewed with the receiving nurse. Lines:   Peripheral IV 03/10/19 Left Forearm (Active)   Site Assessment Clean, dry, & intact 3/10/2019 11:58 AM   Phlebitis Assessment 0 3/10/2019 11:58 AM   Infiltration Assessment 0 3/10/2019 11:58 AM   Dressing Status Clean, dry, & intact 3/10/2019 11:58 AM   Dressing Type Transparent 3/10/2019 11:58 AM   Hub Color/Line Status Blue;Flushed;Patent 3/10/2019 11:58 AM   Action Taken Blood drawn 3/10/2019 11:58 AM       Peripheral IV 03/10/19 Left Wrist (Active)   Site Assessment Clean, dry, & intact 3/10/2019 11:59 AM   Phlebitis Assessment 0 3/10/2019 11:59 AM   Infiltration Assessment 0 3/10/2019 11:59 AM   Dressing Status Clean, dry, & intact 3/10/2019 11:59 AM   Dressing Type Transparent 3/10/2019 11:59 AM   Hub Color/Line Status Blue;Flushed;Patent 3/10/2019 11:59 AM   Action Taken Blood drawn 3/10/2019 11:59 AM        Opportunity for questions and clarification was provided.       Patient transported with:  Marce Oakes

## 2019-03-11 LAB
ANION GAP SERPL CALC-SCNC: 5 MMOL/L (ref 5–15)
BASOPHILS # BLD: 0 K/UL (ref 0–0.1)
BASOPHILS NFR BLD: 0 % (ref 0–1)
BUN SERPL-MCNC: 7 MG/DL (ref 6–20)
BUN/CREAT SERPL: 12 (ref 12–20)
CALCIUM SERPL-MCNC: 9 MG/DL (ref 8.5–10.1)
CHLORIDE SERPL-SCNC: 110 MMOL/L (ref 97–108)
CO2 SERPL-SCNC: 23 MMOL/L (ref 21–32)
CREAT SERPL-MCNC: 0.59 MG/DL (ref 0.55–1.02)
DIFFERENTIAL METHOD BLD: ABNORMAL
EOSINOPHIL # BLD: 0.1 K/UL (ref 0–0.4)
EOSINOPHIL NFR BLD: 2 % (ref 0–7)
ERYTHROCYTE [DISTWIDTH] IN BLOOD BY AUTOMATED COUNT: 12.6 % (ref 11.5–14.5)
GLUCOSE SERPL-MCNC: 89 MG/DL (ref 65–100)
HCT VFR BLD AUTO: 33.5 % (ref 35–47)
HGB BLD-MCNC: 11 G/DL (ref 11.5–16)
IMM GRANULOCYTES # BLD AUTO: 0 K/UL (ref 0–0.04)
IMM GRANULOCYTES NFR BLD AUTO: 0 % (ref 0–0.5)
LYMPHOCYTES # BLD: 1.6 K/UL (ref 0.8–3.5)
LYMPHOCYTES NFR BLD: 24 % (ref 12–49)
MCH RBC QN AUTO: 29.8 PG (ref 26–34)
MCHC RBC AUTO-ENTMCNC: 32.8 G/DL (ref 30–36.5)
MCV RBC AUTO: 90.8 FL (ref 80–99)
MONOCYTES # BLD: 0.8 K/UL (ref 0–1)
MONOCYTES NFR BLD: 11 % (ref 5–13)
NEUTS SEG # BLD: 4.3 K/UL (ref 1.8–8)
NEUTS SEG NFR BLD: 63 % (ref 32–75)
NRBC # BLD: 0 K/UL (ref 0–0.01)
NRBC BLD-RTO: 0 PER 100 WBC
PLATELET # BLD AUTO: 223 K/UL (ref 150–400)
PMV BLD AUTO: 9 FL (ref 8.9–12.9)
POTASSIUM SERPL-SCNC: 4.1 MMOL/L (ref 3.5–5.1)
RBC # BLD AUTO: 3.69 M/UL (ref 3.8–5.2)
SODIUM SERPL-SCNC: 138 MMOL/L (ref 136–145)
WBC # BLD AUTO: 6.8 K/UL (ref 3.6–11)

## 2019-03-11 PROCEDURE — 74011250637 HC RX REV CODE- 250/637: Performed by: HOSPITALIST

## 2019-03-11 PROCEDURE — 74011000258 HC RX REV CODE- 258: Performed by: HOSPITALIST

## 2019-03-11 PROCEDURE — 74011250636 HC RX REV CODE- 250/636: Performed by: HOSPITALIST

## 2019-03-11 PROCEDURE — 36415 COLL VENOUS BLD VENIPUNCTURE: CPT

## 2019-03-11 PROCEDURE — 85025 COMPLETE CBC W/AUTO DIFF WBC: CPT

## 2019-03-11 PROCEDURE — 80048 BASIC METABOLIC PNL TOTAL CA: CPT

## 2019-03-11 PROCEDURE — 65270000029 HC RM PRIVATE

## 2019-03-11 RX ADMIN — SODIUM CHLORIDE 3 G: 900 INJECTION, SOLUTION INTRAVENOUS at 07:02

## 2019-03-11 RX ADMIN — SODIUM CHLORIDE 3 G: 900 INJECTION, SOLUTION INTRAVENOUS at 01:52

## 2019-03-11 RX ADMIN — Medication 10 ML: at 07:03

## 2019-03-11 RX ADMIN — SODIUM CHLORIDE 3 G: 900 INJECTION, SOLUTION INTRAVENOUS at 14:53

## 2019-03-11 RX ADMIN — Medication 1 CAPSULE: at 09:48

## 2019-03-11 RX ADMIN — TRAMADOL HYDROCHLORIDE 50 MG: 50 TABLET, FILM COATED ORAL at 09:50

## 2019-03-11 RX ADMIN — Medication 10 ML: at 14:53

## 2019-03-11 RX ADMIN — SERTRALINE 50 MG: 50 TABLET, FILM COATED ORAL at 09:48

## 2019-03-11 RX ADMIN — TRAMADOL HYDROCHLORIDE 50 MG: 50 TABLET, FILM COATED ORAL at 21:53

## 2019-03-11 RX ADMIN — SODIUM CHLORIDE 3 G: 900 INJECTION, SOLUTION INTRAVENOUS at 19:14

## 2019-03-11 RX ADMIN — Medication 10 ML: at 20:51

## 2019-03-11 NOTE — PROGRESS NOTES
Hospitalist Progress Note  Gustavo Gaxiola MD  Answering service: [phone] from in house phone        Date of Service:  3/11/2019  NAME:  Sergio Thorne  :  1990  MRN:  [id number]      Admission Summary:   Admitted for dog bite     Interval history / Subjective:   wound looks c/d marking of cellulitis improved  This was her PET dog , up to date vaccinated     Assessment & Plan:     1. Right hand/forearm cellulitis:   - cont   with IV Unasyn. Prn Toradol for severe pain. 2. H/o depression: stable, continue home zoloft. 3. Repeat BMP    Code status: Full  DVT prophylaxis: SCD    Care Plan discussed with: Patient/Family and Nurse  Disposition: TBD     Hospital Problems  Never Reviewed          Codes Class Noted POA    * (Principal) Cellulitis ICD-10-CM: L03.90  ICD-9-CM: 682.9  3/10/2019 Yes                Review of Systems:   A comprehensive review of systems was negative. Vital Signs:    Last 24hrs VS reviewed since prior progress note. Most recent are:  Visit Vitals  BP (!) 89/56   Pulse 68   Temp 98.4 °F (36.9 °C)   Resp 17   Wt 68.5 kg (151 lb)   SpO2 98%   BMI 25.92 kg/m²       No intake or output data in the 24 hours ending 19 1004     Physical Examination:             Constitutional:  No acute distress, cooperative, pleasant    ENT:  Oral mucous moist, oropharynx benign. Neck supple,    Resp:  CTA bilaterally. No wheezing/rhonchi/rales. No accessory muscle use   CV:  Regular rhythm, normal rate, no murmurs, gallops, rubs    GI:  Soft, non distended, non tender. normoactive bowel sounds, no hepatosplenomegaly     Musculoskeletal:  No edema, warm, 2+ pulses throughout    Neurologic:  Moves all extremities. AAOx3, CN II-XII reviewed     Psych:  Good insight, Not anxious nor agitated.        Data Review:    I personally reviewed  Image and labs'      Labs:     Recent Labs     19  0200 03/10/19  1153   WBC 6.8 6.9   HGB 11.0* 13.1   HCT 33.5* 39.9    256     Recent Labs     03/10/19  1153      K 5.6*      CO2 22   BUN 9   CREA 0.81   GLU 75   CA 9.9     No results for input(s): SGOT, GPT, ALT, AP, TBIL, TBILI, TP, ALB, GLOB, GGT, AML, LPSE in the last 72 hours. No lab exists for component: AMYP, HLPSE  No results for input(s): INR, PTP, APTT in the last 72 hours. No lab exists for component: INREXT   No results for input(s): FE, TIBC, PSAT, FERR in the last 72 hours. No results found for: FOL, RBCF   No results for input(s): PH, PCO2, PO2 in the last 72 hours. No results for input(s): CPK, CKNDX, TROIQ in the last 72 hours.     No lab exists for component: CPKMB  No results found for: CHOL, CHOLX, CHLST, CHOLV, HDL, LDL, LDLC, DLDLP, TGLX, TRIGL, TRIGP, CHHD, CHHDX  No results found for: GLUCPOC  No results found for: COLOR, APPRN, SPGRU, REFSG, LANCE, PROTU, GLUCU, KETU, BILU, UROU, BISHNU, LEUKU, GLUKE, EPSU, BACTU, WBCU, RBCU, CASTS, UCRY      Medications Reviewed:     Current Facility-Administered Medications   Medication Dose Route Frequency    ampicillin-sulbactam 3 g in 0.9% sodium chloride (MBP/ADV) 100 mL ADV  3 g IntraVENous Q6H    sodium chloride (NS) flush 5-40 mL  5-40 mL IntraVENous Q8H    sodium chloride (NS) flush 5-40 mL  5-40 mL IntraVENous PRN    acetaminophen (TYLENOL) tablet 650 mg  650 mg Oral Q4H PRN    diphenhydrAMINE (BENADRYL) capsule 25 mg  25 mg Oral Q4H PRN    ondansetron (ZOFRAN) injection 4 mg  4 mg IntraVENous Q4H PRN    ketorolac (TORADOL) injection 30 mg  30 mg IntraVENous Q6H PRN    traMADol (ULTRAM) tablet 50 mg  50 mg Oral Q6H PRN    sertraline (ZOLOFT) tablet 50 mg  50 mg Oral DAILY    lactobac ac& pc-s.therm-b.anim (KING Q/RISAQUAD)  1 Cap Oral DAILY     ______________________________________________________________________  EXPECTED LENGTH OF STAY: - - -  ACTUAL LENGTH OF STAY:          1                 Babak Carreon MD

## 2019-03-12 VITALS
RESPIRATION RATE: 18 BRPM | BODY MASS INDEX: 25.92 KG/M2 | OXYGEN SATURATION: 97 % | DIASTOLIC BLOOD PRESSURE: 64 MMHG | WEIGHT: 151 LBS | TEMPERATURE: 100.6 F | SYSTOLIC BLOOD PRESSURE: 102 MMHG | HEART RATE: 82 BPM

## 2019-03-12 PROBLEM — L03.90 CELLULITIS: Status: RESOLVED | Noted: 2019-03-10 | Resolved: 2019-03-12

## 2019-03-12 PROCEDURE — 74011250637 HC RX REV CODE- 250/637: Performed by: HOSPITALIST

## 2019-03-12 PROCEDURE — 74011250636 HC RX REV CODE- 250/636: Performed by: HOSPITALIST

## 2019-03-12 PROCEDURE — 74011000258 HC RX REV CODE- 258: Performed by: HOSPITALIST

## 2019-03-12 RX ORDER — IBUPROFEN 600 MG/1
600 TABLET ORAL
Qty: 15 TAB | Refills: 0 | Status: SHIPPED | OUTPATIENT
Start: 2019-03-12 | End: 2019-03-17

## 2019-03-12 RX ORDER — AMOXICILLIN AND CLAVULANATE POTASSIUM 875; 125 MG/1; MG/1
1 TABLET, FILM COATED ORAL 2 TIMES DAILY
Qty: 24 TAB | Refills: 0 | Status: SHIPPED | OUTPATIENT
Start: 2019-03-12 | End: 2019-03-24

## 2019-03-12 RX ADMIN — Medication 10 ML: at 05:26

## 2019-03-12 RX ADMIN — SODIUM CHLORIDE 3 G: 900 INJECTION, SOLUTION INTRAVENOUS at 07:33

## 2019-03-12 RX ADMIN — ACETAMINOPHEN 650 MG: 325 TABLET ORAL at 10:01

## 2019-03-12 RX ADMIN — ACETAMINOPHEN 650 MG: 325 TABLET ORAL at 04:17

## 2019-03-12 RX ADMIN — Medication 1 CAPSULE: at 10:01

## 2019-03-12 RX ADMIN — SERTRALINE 50 MG: 50 TABLET, FILM COATED ORAL at 10:01

## 2019-03-12 RX ADMIN — SODIUM CHLORIDE 3 G: 900 INJECTION, SOLUTION INTRAVENOUS at 02:21

## 2019-03-12 NOTE — PROGRESS NOTES
[address] March 12, 2019       RE: Waldemar Said      To Whom It May Concern,    This is to certify that Ms Waldemar Said is the mother of Ms. Maria C Yadav who was  hospitalized at [facility] till 3/12/19. She was attending her daughter at the hospital.  She may return to work on 3/13/19. Please feel free to contact my office if you have any questions or concerns. Thank you for your assistance in this matter.       Sincerely,  Catalina Fermin MD

## 2019-03-12 NOTE — DISCHARGE INSTRUCTIONS
Discharge Instructions       PATIENT ID: Donna Buchanan  MRN: [id number]   YOB: 1990    DATE OF ADMISSION: 3/10/2019 12:00 PM    DATE OF DISCHARGE: 3/12/2019    PRIMARY CARE PROVIDER: Bartolo De León MD     ATTENDING PHYSICIAN: Viviana Harp MD  DISCHARGING PROVIDER: Bogdan Barclay MD    To contact this individual call [phone] and ask the  to page. If unavailable ask to be transferred the Adult Hospitalist Department. DISCHARGE DIAGNOSES dOG BITE CELLULITIS    CONSULTATIONS: IP CONSULT TO HOSPITALIST    PROCEDURES/SURGERIES: * No surgery found *    PENDING TEST RESULTS:   At the time of discharge the following test results are still pending:     FOLLOW UP APPOINTMENTS:   Follow-up Information     Follow up With Specialties Details Why Contact Info    Bartolo De León MD  In 1 week  Patient can only remember the practice name and not the physician             ADDITIONAL CARE RECOMMENDATIONS:     DIET: Regular Diet    ACTIVITY: Activity as tolerated    WOUND CARE:     EQUIPMENT needed:       DISCHARGE MEDICATIONS:   See Medication Reconciliation Form    · It is important that you take the medication exactly as they are prescribed. · Keep your medication in the bottles provided by the pharmacist and keep a list of the medication names, dosages, and times to be taken in your wallet. · Do not take other medications without consulting your doctor. NOTIFY YOUR PHYSICIAN FOR ANY OF THE FOLLOWING:   Fever over 101 degrees for 24 hours. Chest pain, shortness of breath, fever, chills, nausea, vomiting, diarrhea, change in mentation, falling, weakness, bleeding. Severe pain or pain not relieved by medications. Or, any other signs or symptoms that you may have questions about. DISPOSITION:  X  Home With:   OT  PT  HH  RN       SNF/Inpatient Rehab/LTAC    Independent/assisted living    Hospice    Other:     CDMP Checked: Yes X     PROBLEM LIST Updated:   Yes X       Signed: Media MD Patrice  3/12/2019  8:09 AM

## 2019-03-12 NOTE — DISCHARGE SUMMARY
Discharge Summary       PATIENT ID: Reilly Jiménez  MRN: [id number]   YOB: 1990    DATE OF ADMISSION: 3/10/2019 12:00 PM    DATE OF DISCHARGE: 3/12/19   PRIMARY CARE PROVIDER: Madhuri Oneill MD     ATTENDING PHYSICIAN: Cordella Councilman  DISCHARGING PROVIDER: Ej Triplett MD    To contact this individual call [phone] and ask the  to page. If unavailable ask to be transferred the Adult Hospitalist Department. CONSULTATIONS: IP CONSULT TO HOSPITALIST    PROCEDURES/SURGERIES: * No surgery found *    ADMITTING [address] [place] [address] COURSE:   Admitted for dog bite   Pet DOG VACCINATED/ TETANUS UPTO DATE      Assessment & Plan:      1. Right hand/forearm cellulitis:   - D/C On Augmentin BID for 12 more days with ibuprofen and michele Q  - Blood cultures NGTD  - area of bite clean dry/ redness decreased  - mild fever noted d/w pt agreed to the plan     2. H/o depression: stable, continue home zoloft.        DISCHARGE DIAGNOSES / PLAN:      1.  d/c home     ADDITIONAL CARE RECOMMENDATIONS:     PENDING TEST RESULTS:   At the time of discharge the following test results are still pending:     FOLLOW UP APPOINTMENTS:    Follow-up Information     Follow up With Specialties Details Why Contact Info    Genet, MD Bartolo  In 1 week  Patient can only remember the practice name and not the physician               DIET: Regular Diet    ACTIVITY: Activity as tolerated    WOUND CARE:     EQUIPMENT needed:       DISCHARGE MEDICATIONS:  Current Discharge Medication List      START taking these medications    Details   amoxicillin-clavulanate (AUGMENTIN) 875-125 mg per tablet Take 1 Tab by mouth two (2) times a day for 12 days. Qty: 24 Tab, Refills: 0      ibuprofen (MOTRIN) 600 mg tablet Take 1 Tab by mouth three (3) times daily (after meals) for 5 days. Qty: 15 Tab, Refills: 0      L.acidoph & parac-S.therm-Bifido (MICHELE Q2/RISAQUAD-2) 16 billion cell cap cap Take 1 Cap by mouth daily for 14 days.   Qty: 14 Cap, Refills: 0         CONTINUE these medications which have NOT CHANGED    Details   lorazepam (ATIVAN PO) Take  by mouth. NOTIFY YOUR PHYSICIAN FOR ANY OF THE FOLLOWING:   Fever over 101 degrees for 24 hours. Chest pain, shortness of breath, fever, chills, nausea, vomiting, diarrhea, change in mentation, falling, weakness, bleeding. Severe pain or pain not relieved by medications. Or, any other signs or symptoms that you may have questions about. DISPOSITION:  x  Home With:   OT  PT  HH  RN       Long term SNF/Inpatient Rehab    Independent/assisted living    Hospice    Other:       PATIENT CONDITION AT DISCHARGE:     Functional status    Poor     Deconditioned    x Independent      Cognition   x  Lucid     Forgetful     Dementia      Catheters/lines (plus indication)    Blake     PICC     PEG    x None      Code status   x  Full code     DNR      PHYSICAL EXAMINATION AT DISCHARGE:     Constitutional:  No acute distress, cooperative, pleasant    ENT:  Oral mucous moist, oropharynx benign. Neck supple,    Resp:  CTA bilaterally. No wheezing/rhonchi/rales. No accessory muscle use   CV:  Regular rhythm, normal rate, no murmurs, gallops, rubs    GI:  Soft, non distended, non tender. normoactive bowel sounds, no hepatosplenomegaly     Musculoskeletal:  No edema, warm, 2+ pulses throughout left hand bite wound c/d can move all fingers , redness subsided     Neurologic:  Moves all extremities.   AAOx3, CN II-XII reviewed                         Psych:  Good insight, Not anxious nor agitated.                CHRONIC MEDICAL DIAGNOSES:  Problem List as of 3/12/2019 Date Reviewed: 3/12/2019          Codes Class Noted - Resolved    * (Principal) RESOLVED: Cellulitis ICD-10-CM: L03.90  ICD-9-CM: 682.9  3/10/2019 - 3/12/2019              Greater than 30  minutes were spent with the patient on counseling and coordination of care    Signed:   Karena Geronimo MD  3/12/2019  8:10 AM

## 2019-03-14 ENCOUNTER — PATIENT OUTREACH (OUTPATIENT)
Dept: OTHER | Age: 29
End: 2019-03-14

## 2019-03-14 NOTE — PROGRESS NOTES
Transition Of Care Note    Patient discharged from [facility] admitted on 3/10 and discharged on 3/12/19  for cellulitis. Medical History:   No past medical history on file. Care Manager contacted the patient by telephone to perform post hospital discharge assessment. Verified  and zip code with patient as identifiers. Provided introduction to self, and explanation of the Nurse Care Manager role. Patient's primary care provider relationship reviewed with patient and modified, as applicable. Red Flags:  Call your doctor now or seek immediate medical care if:    · You have signs that your infection is getting worse, such as:  ? Increased pain, swelling, warmth, or redness. ? Red streaks leading from the area. ? Pus draining from the area. ? A fever.     · You get a rash.    Watch closely for changes in your health, and be sure to contact your doctor if:    · You do not get better as expected. Condition Focused Assessment:     In the last 24 hour have you experienced; Fever no    Low body temperature no    Chills or shaking no    Sweating no    Fast heart rate no    Fast breathing no    Dizziness/lightheadedness no    Confusion or unusual change in mental status no    Diarrhea yes, antibiotic related   Nausea no    Vomiting no    Shortness of breath or difficulty breathing no    Less urine output no    Cold, clammy, and pale skin no     Skin rash or skin color changes no  Skin- any open wounds or incisions? Yes, cellulitis  Description of wound- puncture wound, looks much better  New or worsening pain? no  If yes, pain rated 0-10: 0 Location/pain characteristics: n/a   New or worsening numbness or tingling? no  If yes, location of numbness and tingling: n/a  Any IV access site for antibiotics? n/a  Recent urinary catheter? no  Does patient have incentive spirometer?  no  If yes, how frequently is patient using incentive spirometer? n/a        Patient reports the following: Patient states her cellulitis is much better. She is taking the antibiotic, as prescribed and having some diarrhea. She is taking probiotics and motrin two times a day. Recommended that she eat something when taking the motrin. She requested the number for employee wellness so she could set up an appointment to go back to work. Provided the number to her. Patient stated she did not have any goals that she would like to work on at this time. Medication:   New Medications at Discharge: augementin, motrin, michele q (wasn't available, she has been doing probiotic drinks)  Changed Medications at Discharge:   Discontinued Medications at Discharge: no  Current Outpatient Medications   Medication Sig    amoxicillin-clavulanate (AUGMENTIN) 875-125 mg per tablet Take 1 Tab by mouth two (2) times a day for 12 days.  ibuprofen (MOTRIN) 600 mg tablet Take 1 Tab by mouth three (3) times daily (after meals) for 5 days.  L.acidoph & parac-S.therm-Bifido (MICHELE Q2/RISAQUAD-2) 16 billion cell cap cap Take 1 Cap by mouth daily for 14 days.  lorazepam (ATIVAN PO) Take  by mouth. No current facility-administered medications for this visit. There are no discontinued medications. Performed medication reconciliation with patient, and patient verbalizes understanding of administration of home medications. There were no barriers to obtaining medications identified at this time. Inpatient RRAT score: not noted  Was this a readmission?  no   Patient stated reason for the readmission: n/a    Barriers/Support system:  patient and sister    Home health/DME in place per discharge orders    Barriers/Challenges to Care:[]  Decline in memory    []  Language barrier     []  Emotional                  []  Limited mobility  []  Lack of motivation     [] Vision, hearing or cognitive impairment []  Knowledge [] Financial Barriers []  Lack of support  []  Pain []  Other [x]  None    CM Identified  Problems   (contributing problems for risk for readmission)  1. Risk for infection - reviewed s/sx of infection per red flags  2. Risk for GI bleed (taking NSAID)- advised patient to take ibuprofen with food, she is only taking the medication two times per day. Discharge Instructions :  Reviewed discharge instructions with patient. Patient verbalizes understanding of discharge instructions and follow-up care. Advance Care Planning:   Patient was offered the opportunity to discuss advance care planning:  no     Does patient have an Advance Directive:  no   If no, did you provide information on Caring Connections?  no     PCP/Specialist follow up: Patient scheduled to follow up with Dr. Garcia Sterling, PCP on 3/21/19. Reviewed red flags with patient, and patient verbalizes understanding. Patient given an opportunity to ask questions. No other clinical/social/functional needs noted. The patient agrees to contact the PCP office for questions related to their healthcare. The patient expressed thanks, offered no additional questions and ended the call. F/up call 4/1.

## 2019-03-14 NOTE — PATIENT INSTRUCTIONS

## 2019-03-15 LAB
BACTERIA SPEC CULT: NORMAL
SERVICE CMNT-IMP: NORMAL

## 2019-03-18 ENCOUNTER — OFFICE VISIT (OUTPATIENT)
Dept: FAMILY MEDICINE CLINIC | Age: 29
End: 2019-03-18

## 2019-03-18 VITALS
BODY MASS INDEX: 25.85 KG/M2 | WEIGHT: 151.4 LBS | TEMPERATURE: 97.7 F | RESPIRATION RATE: 16 BRPM | HEIGHT: 64 IN | OXYGEN SATURATION: 97 % | SYSTOLIC BLOOD PRESSURE: 119 MMHG | HEART RATE: 67 BPM | DIASTOLIC BLOOD PRESSURE: 82 MMHG

## 2019-03-18 DIAGNOSIS — Z87.2 HISTORY OF CELLULITIS: ICD-10-CM
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
## 2019-03-18 DIAGNOSIS — S61.531D: Primary | ICD-10-CM

## 2019-03-18 RX ORDER — SERTRALINE HYDROCHLORIDE 50 MG/1
TABLET, FILM COATED ORAL
Refills: 0 | COMMUNITY
Start: 2019-02-25 | End: 2020-03-03

## 2019-03-18 RX ORDER — FLUCONAZOLE 150 MG/1
150 TABLET ORAL DAILY
Qty: 2 TAB | Refills: 0 | Status: SHIPPED | OUTPATIENT
Start: 2019-03-18 | End: 2019-03-20

## 2019-03-18 NOTE — PROGRESS NOTES
HISTORY OF PRESENT ILLNESS  Sumit Washburn is a 29 y.o. female. Patient here to get clearance to return to work from recent cellulitis as a result of a dog bite that occurred on 3/9/19. The bite came from her dog. The dog was up to date on immunizations. Patient went to [facility] and was placed on augmentin. The next morning she had fever, increased redness, streaking, and swelling of her right wrist into her forearm. She went to the ED and was admitted for 48 hours of IV antibiotics due to cellulitis. She received a Tdap booster. Now she is left with a scab on her right wrist and redness and swelling has improved. She is still taking augmentin x 10 days. She also reports a vaginal yeast infection due to antibiotics and requests treatment. Visit Vitals  /82   Pulse 67   Temp 97.7 °F (36.5 °C) (Oral)   Resp 16   Ht 5' 4\" (1.626 m)   Wt 151 lb 6.4 oz (68.7 kg)   SpO2 97%   BMI 25.99 kg/m²       HPI    Review of Systems   Skin:        Dog bite        Physical Exam   Constitutional: She is oriented to person, place, and time. She appears well-developed and well-nourished. Neurological: She is alert and oriented to person, place, and time. Skin:   Scab noted to radial aspect of right wrist due to puncture wound from dog's tooth, no surrounding erythema, warmth, or swelling noted. Psychiatric: She has a normal mood and affect. ASSESSMENT and PLAN    ICD-10-CM ICD-9-CM    1. Puncture wound of right wrist, subsequent encounter S61.531D V58.89      881.02    2. Dog bite, subsequent encounter W54. 0XXD V58.89      879.8    3.  History of cellulitis Z87.2 V13.3      Orders Placed This Encounter    sertraline (ZOLOFT) 50 mg tablet    fluconazole (DIFLUCAN) 150 mg tablet   complete ordered course of Augmentin as prescribed  May return to work without restrictions  Advised to glove with patient care until scab falls off

## 2019-04-02 ENCOUNTER — PATIENT OUTREACH (OUTPATIENT)
Dept: OTHER | Age: 29
End: 2019-04-02

## 2019-04-10 ENCOUNTER — PATIENT OUTREACH (OUTPATIENT)
Dept: OTHER | Age: 29
End: 2019-04-10

## 2019-04-10 NOTE — PROGRESS NOTES
Attempt to reach patient for follow up. Discreet VM left with contact information. Will try back in a few weeks.

## 2019-05-01 ENCOUNTER — DOCUMENTATION ONLY (OUTPATIENT)
Dept: OTHER | Age: 29
End: 2019-05-01

## 2019-05-01 NOTE — PROGRESS NOTES
Resolving current episode Transitions of care complete. No further ED/UC or hospital admissions within 30 days post discharge. Patient attended follow-up appointments as directed. No outreach from patient to [address] [place] [address].

## 2019-06-15 ENCOUNTER — APPOINTMENT (OUTPATIENT)
Dept: CT IMAGING | Age: 29
End: 2019-06-15
Attending: EMERGENCY MEDICINE
Payer: COMMERCIAL

## 2019-06-15 ENCOUNTER — HOSPITAL ENCOUNTER (EMERGENCY)
Age: 29
Discharge: HOME | End: 2019-06-15
Attending: EMERGENCY MEDICINE | Admitting: EMERGENCY MEDICINE
Payer: COMMERCIAL

## 2019-06-15 VITALS
BODY MASS INDEX: 26.57 KG/M2 | RESPIRATION RATE: 16 BRPM | HEIGHT: 64 IN | HEART RATE: 57 BPM | WEIGHT: 155.65 LBS | TEMPERATURE: 98.2 F | DIASTOLIC BLOOD PRESSURE: 70 MMHG | OXYGEN SATURATION: 97 % | SYSTOLIC BLOOD PRESSURE: 123 MMHG

## 2019-06-15 DIAGNOSIS — N20.0 KIDNEY STONE: Primary | ICD-10-CM

## 2019-06-15 LAB
ALBUMIN SERPL-MCNC: 4 G/DL (ref 3.5–5)
ALBUMIN/GLOB SERPL: 1.1 {RATIO} (ref 1.1–2.2)
ALP SERPL-CCNC: 77 U/L (ref 45–117)
ALT SERPL-CCNC: 18 U/L (ref 12–78)
ANION GAP SERPL CALC-SCNC: 8 MMOL/L (ref 5–15)
APPEARANCE UR: CLEAR
AST SERPL-CCNC: 15 U/L (ref 15–37)
BACTERIA URNS QL MICRO: NEGATIVE /HPF
BASOPHILS # BLD: 0 K/UL (ref 0–0.1)
BASOPHILS NFR BLD: 0 % (ref 0–1)
BILIRUB SERPL-MCNC: 0.3 MG/DL (ref 0.2–1)
BILIRUB UR QL: NEGATIVE
BUN SERPL-MCNC: 22 MG/DL (ref 6–20)
BUN/CREAT SERPL: 29 (ref 12–20)
CALCIUM SERPL-MCNC: 9.1 MG/DL (ref 8.5–10.1)
CHLORIDE SERPL-SCNC: 107 MMOL/L (ref 97–108)
CO2 SERPL-SCNC: 24 MMOL/L (ref 21–32)
COLOR UR: ABNORMAL
CREAT SERPL-MCNC: 0.77 MG/DL (ref 0.55–1.02)
DIFFERENTIAL METHOD BLD: NORMAL
EOSINOPHIL # BLD: 0.1 K/UL (ref 0–0.4)
EOSINOPHIL NFR BLD: 2 % (ref 0–7)
EPITH CASTS URNS QL MICRO: ABNORMAL /LPF
ERYTHROCYTE [DISTWIDTH] IN BLOOD BY AUTOMATED COUNT: 12.5 % (ref 11.5–14.5)
GLOBULIN SER CALC-MCNC: 3.8 G/DL (ref 2–4)
GLUCOSE SERPL-MCNC: 81 MG/DL (ref 65–100)
GLUCOSE UR STRIP.AUTO-MCNC: NEGATIVE MG/DL
HCG UR QL: NEGATIVE
HCT VFR BLD AUTO: 36.7 % (ref 35–47)
HGB BLD-MCNC: 12.3 G/DL (ref 11.5–16)
HGB UR QL STRIP: NEGATIVE
HYALINE CASTS URNS QL MICRO: ABNORMAL /LPF (ref 0–5)
IMM GRANULOCYTES # BLD AUTO: 0 K/UL (ref 0–0.04)
IMM GRANULOCYTES NFR BLD AUTO: 0 % (ref 0–0.5)
KETONES UR QL STRIP.AUTO: NEGATIVE MG/DL
LEUKOCYTE ESTERASE UR QL STRIP.AUTO: ABNORMAL
LIPASE SERPL-CCNC: 153 U/L (ref 73–393)
LYMPHOCYTES # BLD: 2.7 K/UL (ref 0.8–3.5)
LYMPHOCYTES NFR BLD: 35 % (ref 12–49)
MCH RBC QN AUTO: 30 PG (ref 26–34)
MCHC RBC AUTO-ENTMCNC: 33.5 G/DL (ref 30–36.5)
MCV RBC AUTO: 89.5 FL (ref 80–99)
MONOCYTES # BLD: 0.8 K/UL (ref 0–1)
MONOCYTES NFR BLD: 10 % (ref 5–13)
NEUTS SEG # BLD: 4.2 K/UL (ref 1.8–8)
NEUTS SEG NFR BLD: 53 % (ref 32–75)
NITRITE UR QL STRIP.AUTO: NEGATIVE
NRBC # BLD: 0 K/UL (ref 0–0.01)
NRBC BLD-RTO: 0 PER 100 WBC
PH UR STRIP: 6 [PH] (ref 5–8)
PLATELET # BLD AUTO: 278 K/UL (ref 150–400)
PMV BLD AUTO: 9.1 FL (ref 8.9–12.9)
POTASSIUM SERPL-SCNC: 3.5 MMOL/L (ref 3.5–5.1)
PROT SERPL-MCNC: 7.8 G/DL (ref 6.4–8.2)
PROT UR STRIP-MCNC: NEGATIVE MG/DL
RBC # BLD AUTO: 4.1 M/UL (ref 3.8–5.2)
RBC #/AREA URNS HPF: ABNORMAL /HPF (ref 0–5)
SODIUM SERPL-SCNC: 139 MMOL/L (ref 136–145)
SP GR UR REFRACTOMETRY: 1.03 (ref 1–1.03)
UA: UC IF INDICATED,UAUC: ABNORMAL
UROBILINOGEN UR QL STRIP.AUTO: 0.2 EU/DL (ref 0.2–1)
WBC # BLD AUTO: 7.9 K/UL (ref 3.6–11)
WBC URNS QL MICRO: ABNORMAL /HPF (ref 0–4)

## 2019-06-15 PROCEDURE — 99284 EMERGENCY DEPT VISIT MOD MDM: CPT

## 2019-06-15 PROCEDURE — 81025 URINE PREGNANCY TEST: CPT

## 2019-06-15 PROCEDURE — 96375 TX/PRO/DX INJ NEW DRUG ADDON: CPT

## 2019-06-15 PROCEDURE — 74176 CT ABD & PELVIS W/O CONTRAST: CPT

## 2019-06-15 PROCEDURE — 96361 HYDRATE IV INFUSION ADD-ON: CPT

## 2019-06-15 PROCEDURE — 96374 THER/PROPH/DIAG INJ IV PUSH: CPT

## 2019-06-15 PROCEDURE — 81001 URINALYSIS AUTO W/SCOPE: CPT

## 2019-06-15 PROCEDURE — 74011250637 HC RX REV CODE- 250/637: Performed by: EMERGENCY MEDICINE

## 2019-06-15 PROCEDURE — 80053 COMPREHEN METABOLIC PANEL: CPT

## 2019-06-15 PROCEDURE — 36415 COLL VENOUS BLD VENIPUNCTURE: CPT

## 2019-06-15 PROCEDURE — 87086 URINE CULTURE/COLONY COUNT: CPT

## 2019-06-15 PROCEDURE — 74011250636 HC RX REV CODE- 250/636: Performed by: EMERGENCY MEDICINE

## 2019-06-15 PROCEDURE — 85025 COMPLETE CBC W/AUTO DIFF WBC: CPT

## 2019-06-15 PROCEDURE — 83690 ASSAY OF LIPASE: CPT

## 2019-06-15 RX ORDER — HYDROCODONE BITARTRATE AND ACETAMINOPHEN 5; 325 MG/1; MG/1
1 TABLET ORAL
Qty: 10 TAB | Refills: 0 | Status: SHIPPED | OUTPATIENT
Start: 2019-06-15 | End: 2019-06-18

## 2019-06-15 RX ORDER — TAMSULOSIN HYDROCHLORIDE 0.4 MG/1
0.4 CAPSULE ORAL
Status: COMPLETED | OUTPATIENT
Start: 2019-06-15 | End: 2019-06-15

## 2019-06-15 RX ORDER — KETOROLAC TROMETHAMINE 10 MG/1
10 TABLET, FILM COATED ORAL
Qty: 8 TAB | Refills: 0 | Status: SHIPPED | OUTPATIENT
Start: 2019-06-15 | End: 2020-03-03

## 2019-06-15 RX ORDER — HYDROMORPHONE HYDROCHLORIDE 1 MG/ML
0.5 INJECTION, SOLUTION INTRAMUSCULAR; INTRAVENOUS; SUBCUTANEOUS
Status: COMPLETED | OUTPATIENT
Start: 2019-06-15 | End: 2019-06-15

## 2019-06-15 RX ORDER — ONDANSETRON 4 MG/1
4 TABLET, ORALLY DISINTEGRATING ORAL
Qty: 10 TAB | Refills: 0 | Status: SHIPPED | OUTPATIENT
Start: 2019-06-15 | End: 2020-03-03

## 2019-06-15 RX ORDER — TAMSULOSIN HYDROCHLORIDE 0.4 MG/1
0.4 CAPSULE ORAL DAILY
Qty: 7 CAP | Refills: 0 | Status: SHIPPED | OUTPATIENT
Start: 2019-06-15 | End: 2019-06-22

## 2019-06-15 RX ORDER — HYDROCODONE BITARTRATE AND ACETAMINOPHEN 5; 325 MG/1; MG/1
1 TABLET ORAL ONCE
Status: COMPLETED | OUTPATIENT
Start: 2019-06-15 | End: 2019-06-15

## 2019-06-15 RX ORDER — KETOROLAC TROMETHAMINE 30 MG/ML
15 INJECTION, SOLUTION INTRAMUSCULAR; INTRAVENOUS
Status: COMPLETED | OUTPATIENT
Start: 2019-06-15 | End: 2019-06-15

## 2019-06-15 RX ORDER — ONDANSETRON 2 MG/ML
4 INJECTION INTRAMUSCULAR; INTRAVENOUS
Status: COMPLETED | OUTPATIENT
Start: 2019-06-15 | End: 2019-06-15

## 2019-06-15 RX ADMIN — KETOROLAC TROMETHAMINE 15 MG: 30 INJECTION, SOLUTION INTRAMUSCULAR at 19:44

## 2019-06-15 RX ADMIN — HYDROMORPHONE HYDROCHLORIDE 0.5 MG: 1 INJECTION, SOLUTION INTRAMUSCULAR; INTRAVENOUS; SUBCUTANEOUS at 20:29

## 2019-06-15 RX ADMIN — TAMSULOSIN HYDROCHLORIDE 0.4 MG: 0.4 CAPSULE ORAL at 20:57

## 2019-06-15 RX ADMIN — HYDROCODONE BITARTRATE AND ACETAMINOPHEN 1 TABLET: 5; 325 TABLET ORAL at 21:36

## 2019-06-15 RX ADMIN — ONDANSETRON 4 MG: 2 INJECTION INTRAMUSCULAR; INTRAVENOUS at 19:44

## 2019-06-15 RX ADMIN — SODIUM CHLORIDE 1000 ML: 900 INJECTION, SOLUTION INTRAVENOUS at 19:44

## 2019-06-15 NOTE — ED PROVIDER NOTES
29 y.o. female with no significant past medical history who presents from work with chief complaint of abdominal pain. Patient states she had a sudden onset of abdominal pain while she was at work in [facility] PTA to [facility] ED. Patient presents to [facility] ED with persisting abdominal pain described as \"sharp\" in her RLQ, rating pain as a 9/10. Patient endorses accompanying nausea. Patient endorses a hx of ovarian cysts but states this pain is new. Patient states LMP ~1 month ago. Patient denies fever, vomitng, chills, diarrhea, constipation, vaginal discahrge, melena, hematochezia, dysuria, and hematuria. There are no other acute medical concerns at this time. Social hx: (+) Tobacco use, No EtOH use (Currently), No illcit drug use. PCP: Genet, MD Bartolo    Note written by Betito Tian, as dictated by Aylin Webster MD 7:30 PM       The history is provided by the patient. No  was used. No past medical history on file. No past surgical history on file. No family history on file.     Social History     Socioeconomic History    Marital status: SINGLE     Spouse name: Not on file    Number of children: Not on file    Years of education: Not on file    Highest education level: Not on file   Occupational History    Not on file   Social Needs    Financial resource strain: Not on file    Food insecurity:     Worry: Not on file     Inability: Not on file    Transportation needs:     Medical: Not on file     Non-medical: Not on file   Tobacco Use    Smoking status: Current Every Day Smoker    Smokeless tobacco: Never Used   Substance and Sexual Activity    Alcohol use: Not on file    Drug use: Not on file    Sexual activity: Not on file   Lifestyle    Physical activity:     Days per week: Not on file     Minutes per session: Not on file    Stress: Not on file   Relationships    Social connections:     Talks on phone: Not on file     Gets together: Not on file     Attends Denominational service: Not on file     Active member of club or organization: Not on file     Attends meetings of clubs or organizations: Not on file     Relationship status: Not on file    Intimate partner violence:     Fear of current or ex partner: Not on file     Emotionally abused: Not on file     Physically abused: Not on file     Forced sexual activity: Not on file   Other Topics Concern    Not on file   Social History Narrative    Not on file         ALLERGIES: Patient has no known allergies. Review of Systems   Constitutional: Negative for activity change, appetite change, chills, fatigue and fever. HENT: Negative for ear pain, facial swelling, sore throat and trouble swallowing. Eyes: Negative for pain, discharge and visual disturbance. Respiratory: Negative for chest tightness, shortness of breath and wheezing. Cardiovascular: Negative for chest pain and palpitations. Gastrointestinal: Positive for abdominal pain and nausea. Negative for blood in stool, constipation, diarrhea and vomiting. Genitourinary: Negative for difficulty urinating, dysuria, flank pain, hematuria and vaginal discharge. Musculoskeletal: Negative for arthralgias, joint swelling, myalgias and neck pain. Skin: Negative for color change and rash. Neurological: Negative for dizziness, weakness, numbness and headaches. Hematological: Negative for adenopathy. Does not bruise/bleed easily. Psychiatric/Behavioral: Negative for behavioral problems, confusion and sleep disturbance. All other systems reviewed and are negative. There were no vitals filed for this visit.          Physical Exam   Physical Examination: General appearance - alert, moderate distress, oriented to person, place, and time and normal appearing weight  Eyes - pupils equal and reactive, extraocular eye movements intact  Neck - supple, no significant adenopathy  Chest - clear to auscultation, no wheezes, rales or rhonchi, symmetric air entry  Heart - normal rate, regular rhythm, normal S1, S2, no murmurs, rubs, clicks or gallops  Abdomen - soft, nontender, nondistended, no masses or organomegaly  Back exam - full range of motion, no tenderness, palpable spasm or pain on motion  Neurological - alert, oriented, normal speech, no focal findings or movement disorder noted  Musculoskeletal - no joint tenderness, deformity or swelling  Extremities - peripheral pulses normal, no pedal edema, no clubbing or cyanosis  Skin - normal coloration and turgor, no rashes, no suspicious skin lesions noted  MDM  Number of Diagnoses or Management Options  Kidney stone:      Amount and/or Complexity of Data Reviewed  Clinical lab tests: ordered and reviewed  Tests in the radiology section of CPT®: ordered and reviewed  Decide to obtain previous medical records or to obtain history from someone other than the patient: yes  Review and summarize past medical records: yes  Independent visualization of images, tracings, or specimens: yes    Patient Progress  Patient progress: improved         Procedures  Pain relieved with meds in ED. Pt afebrile, nontoxic, VSS. Will tx for kidney stone. F/u with urology or return to ED for worsening symptoms. VSS.

## 2019-06-16 NOTE — ED NOTES
Discharge instructions given to patient, all questions answered and patient verbalized understanding. Patient to ED lobby via wheelchair.

## 2019-06-16 NOTE — DISCHARGE INSTRUCTIONS
Patient Education        Kidney Stone: Care Instructions  Your Care Instructions    Kidney stones are formed when salts, minerals, and other substances normally found in the urine clump together. They can be as small as grains of sand or, rarely, as large as golf balls. While the stone is traveling through the ureter, which is the tube that carries urine from the kidney to the bladder, you will probably feel pain. The pain may be mild or very severe. You may also have some blood in your urine. As soon as the stone reaches the bladder, any intense pain should go away. If a stone is too large to pass on its own, you may need a medical procedure to help you pass the stone. The doctor has checked you carefully, but problems can develop later. If you notice any problems or new symptoms, get medical treatment right away. Follow-up care is a key part of your treatment and safety. Be sure to make and go to all appointments, and call your doctor if you are having problems. It's also a good idea to know your test results and keep a list of the medicines you take. How can you care for yourself at home? · Drink plenty of fluids, enough so that your urine is light yellow or clear like water. If you have kidney, heart, or liver disease and have to limit fluids, talk with your doctor before you increase the amount of fluids you drink. · Take pain medicines exactly as directed. Call your doctor if you think you are having a problem with your medicine. ? If the doctor gave you a prescription medicine for pain, take it as prescribed. ? If you are not taking a prescription pain medicine, ask your doctor if you can take an over-the-counter medicine. Read and follow all instructions on the label. · Your doctor may ask you to strain your urine so that you can collect your kidney stone when it passes. You can use a kitchen strainer or a tea strainer to catch the stone.  Store it in a plastic bag until you see your doctor again.  Preventing future kidney stones  Some changes in your diet may help prevent kidney stones. Depending on the cause of your stones, your doctor may recommend that you:  · Drink plenty of fluids, enough so that your urine is light yellow or clear like water. If you have kidney, heart, or liver disease and have to limit fluids, talk with your doctor before you increase the amount of fluids you drink. · Limit coffee, tea, and alcohol. Also avoid grapefruit juice. · Do not take more than the recommended daily dose of vitamins C and D.  · Avoid antacids such as Gaviscon, Maalox, Mylanta, or Tums. · Limit the amount of salt (sodium) in your diet. · Eat a balanced diet that is not too high in protein. · Limit foods that are high in a substance called oxalate, which can cause kidney stones. These foods include dark green vegetables, rhubarb, chocolate, wheat bran, nuts, cranberries, and beans. When should you call for help? Call your doctor now or seek immediate medical care if:    · You cannot keep down fluids.     · Your pain gets worse.     · You have a fever or chills.     · You have new or worse pain in your back just below your rib cage (the flank area).     · You have new or more blood in your urine.    Watch closely for changes in your health, and be sure to contact your doctor if:    · You do not get better as expected. Where can you learn more? Go to http://adarsh-juan miguel.info/. Enter R425 in the search box to learn more about \"Kidney Stone: Care Instructions. \"  Current as of: March 14, 2018  Content Version: 11.9  © 1307-1197 [employer]. Care instructions adapted under license by [employer] (which disclaims liability or warranty for this information).  If you have questions about a medical condition or this instruction, always ask your healthcare professional. [address] any warranty or liability for your use of this information. We hope that we have addressed all of your medical concerns. The examination and treatment you received in the Emergency Department were for an emergent problem and were not intended as complete care. It is important that you follow up with your healthcare provider(s) for ongoing care. If your symptoms worsen or do not improve as expected, and you are unable to reach your usual health care provider(s), you should return to the Emergency Department. Today's healthcare is undergoing tremendous change, and patient satisfaction surveys are one of the many tools to assess the quality of medical care. You may receive a survey from the [employer] regarding your experience in the Emergency Department. I hope that your experience has been completely positive, particularly the medical care that I provided. As such, please participate in the survey; anything less than excellent does not meet my expectations or intentions. [facility] and 8 [facility] participate in nationally recognized quality of care measures. If your blood pressure is greater than 120/80, as reported below, we urge that you seek medical care to address the potential of high blood pressure, commonly known as hypertension. Hypertension can be hereditary or can be caused by certain medical conditions, pain, stress, or \"white coat syndrome. \"       Please make an appointment with your health care provider(s) for follow up of your Emergency Department visit. VITALS:   Patient Vitals for the past 8 hrs:   Temp Pulse Resp BP SpO2   06/15/19 1933 98.2 °F (36.8 °C) (!) 57 16 125/75 99 %          Thank you for allowing us to provide you with medical care today. We realize that you have many choices for your emergency care needs. Please choose us in the future for any continued health care needs. Fabi Lynn, [address] [facility].   Office: [phone]            Recent Results (from the past 24 hour(s))   CBC WITH AUTOMATED DIFF    Collection Time: 06/15/19  7:37 PM   Result Value Ref Range    WBC 7.9 3.6 - 11.0 K/uL    RBC 4.10 3.80 - 5.20 M/uL    HGB 12.3 11.5 - 16.0 g/dL    HCT 36.7 35.0 - 47.0 %    MCV 89.5 80.0 - 99.0 FL    MCH 30.0 26.0 - 34.0 PG    MCHC 33.5 30.0 - 36.5 g/dL    RDW 12.5 11.5 - 14.5 %    PLATELET 085 749 - 861 K/uL    MPV 9.1 8.9 - 12.9 FL    NRBC 0.0 0  WBC    ABSOLUTE NRBC 0.00 0.00 - 0.01 K/uL    NEUTROPHILS 53 32 - 75 %    LYMPHOCYTES 35 12 - 49 %    MONOCYTES 10 5 - 13 %    EOSINOPHILS 2 0 - 7 %    BASOPHILS 0 0 - 1 %    IMMATURE GRANULOCYTES 0 0.0 - 0.5 %    ABS. NEUTROPHILS 4.2 1.8 - 8.0 K/UL    ABS. LYMPHOCYTES 2.7 0.8 - 3.5 K/UL    ABS. MONOCYTES 0.8 0.0 - 1.0 K/UL    ABS. EOSINOPHILS 0.1 0.0 - 0.4 K/UL    ABS. BASOPHILS 0.0 0.0 - 0.1 K/UL    ABS. IMM. GRANS. 0.0 0.00 - 0.04 K/UL    DF AUTOMATED     METABOLIC PANEL, COMPREHENSIVE    Collection Time: 06/15/19  7:37 PM   Result Value Ref Range    Sodium 139 136 - 145 mmol/L    Potassium 3.5 3.5 - 5.1 mmol/L    Chloride 107 97 - 108 mmol/L    CO2 24 21 - 32 mmol/L    Anion gap 8 5 - 15 mmol/L    Glucose 81 65 - 100 mg/dL    BUN 22 (H) 6 - 20 MG/DL    Creatinine 0.77 0.55 - 1.02 MG/DL    BUN/Creatinine ratio 29 (H) 12 - 20      GFR est AA >60 >60 ml/min/1.73m2    GFR est non-AA >60 >60 ml/min/1.73m2    Calcium 9.1 8.5 - 10.1 MG/DL    Bilirubin, total 0.3 0.2 - 1.0 MG/DL    ALT (SGPT) 18 12 - 78 U/L    AST (SGOT) 15 15 - 37 U/L    Alk.  phosphatase 77 45 - 117 U/L    Protein, total 7.8 6.4 - 8.2 g/dL    Albumin 4.0 3.5 - 5.0 g/dL    Globulin 3.8 2.0 - 4.0 g/dL    A-G Ratio 1.1 1.1 - 2.2     LIPASE    Collection Time: 06/15/19  7:37 PM   Result Value Ref Range    Lipase 153 73 - 393 U/L   HCG URINE, QL. - POC    Collection Time: 06/15/19  7:41 PM   Result Value Ref Range    Pregnancy test,urine (POC) NEGATIVE  NEG         Ct Abd Pelv Wo Cont    Result Date: 6/15/2019  EXAM: CT ABD PELV WO CONT INDICATION: right flank pain sudden onset prior to admission with severe pain 9/10 on VAS COMPARISON: None CONTRAST:  None. TECHNIQUE: Thin axial images were obtained through the abdomen and pelvis. Coronal and sagittal reconstructions were generated. Oral contrast was not administered. CT dose reduction was achieved through use of a standardized protocol tailored for this examination and automatic exposure control for dose modulation. The absence of intravenous and oral contrast material reduces the capacity of CT to evaluate the bowel and solid organs. FINDINGS: LUNG BASES: Clear. INCIDENTALLY IMAGED HEART AND MEDIASTINUM: Unremarkable. LIVER: No mass or biliary dilatation. GALLBLADDER: Unremarkable. SPLEEN: No mass. PANCREAS: No mass or ductal dilatation. ADRENALS: Unremarkable. KIDNEYS/URETERS: Borderline renal pelvocaliectasis. Mild right ureteral distention to the level of the ureterovesical junction where a 3 mm calculus is shown. Left renal collecting system normal. STOMACH: Unremarkable. SMALL BOWEL: No dilatation or wall thickening. COLON: No dilatation or wall thickening. APPENDIX: Unremarkable. PERITONEUM: No ascites or pneumoperitoneum. RETROPERITONEUM: No lymphadenopathy or aortic aneurysm. REPRODUCTIVE ORGANS: Unremarkable. URINARY BLADDER: No mass or calculus. BONES: Normal. ADDITIONAL COMMENTS: 3 mm calculus at right ureteral vesicle junction with mild hydroureter.      IMPRESSION:

## 2019-06-17 LAB
BACTERIA SPEC CULT: NORMAL
CC UR VC: NORMAL
SERVICE CMNT-IMP: NORMAL

## 2020-03-03 ENCOUNTER — OFFICE VISIT (OUTPATIENT)
Dept: URGENT CARE | Age: 30
End: 2020-03-03

## 2020-03-03 VITALS
OXYGEN SATURATION: 98 % | HEIGHT: 64 IN | BODY MASS INDEX: 27.96 KG/M2 | SYSTOLIC BLOOD PRESSURE: 101 MMHG | RESPIRATION RATE: 18 BRPM | TEMPERATURE: 98.6 F | WEIGHT: 163.8 LBS | HEART RATE: 94 BPM | DIASTOLIC BLOOD PRESSURE: 67 MMHG

## 2020-03-03 DIAGNOSIS — L05.91 PILONIDAL CYST WITHOUT ABSCESS: Primary | ICD-10-CM

## 2020-03-03 RX ORDER — CLINDAMYCIN HYDROCHLORIDE 300 MG/1
300 CAPSULE ORAL 3 TIMES DAILY
Qty: 30 CAP | Refills: 0 | Status: SHIPPED | OUTPATIENT
Start: 2020-03-03 | End: 2020-03-13

## 2020-03-03 NOTE — PROGRESS NOTES
The history is provided by the patient. Skin Problem   This is a new problem. The current episode started yesterday. The problem occurs constantly. The problem has not changed since onset. Associated symptoms comments: Tenderness to tailbone. Nothing aggravates the symptoms. Nothing relieves the symptoms. She has tried nothing for the symptoms. Patient have a history of pilonidal cyst.    History reviewed. No pertinent past medical history. Past Surgical History:   Procedure Laterality Date    HX HEENT      lasik         History reviewed. No pertinent family history.      Social History     Socioeconomic History    Marital status: SINGLE     Spouse name: Not on file    Number of children: Not on file    Years of education: Not on file    Highest education level: Not on file   Occupational History    Not on file   Social Needs    Financial resource strain: Not on file    Food insecurity:     Worry: Not on file     Inability: Not on file    Transportation needs:     Medical: Not on file     Non-medical: Not on file   Tobacco Use    Smoking status: Current Every Day Smoker    Smokeless tobacco: Never Used   Substance and Sexual Activity    Alcohol use: Not Currently    Drug use: Never    Sexual activity: Not on file   Lifestyle    Physical activity:     Days per week: Not on file     Minutes per session: Not on file    Stress: Not on file   Relationships    Social connections:     Talks on phone: Not on file     Gets together: Not on file     Attends Cheondoism service: Not on file     Active member of club or organization: Not on file     Attends meetings of clubs or organizations: Not on file     Relationship status: Not on file    Intimate partner violence:     Fear of current or ex partner: Not on file     Emotionally abused: Not on file     Physically abused: Not on file     Forced sexual activity: Not on file   Other Topics Concern    Not on file   Social History Narrative    Not on file                ALLERGIES: Patient has no known allergies. Review of Systems   Constitutional: Negative for chills, fatigue and fever. Respiratory: Negative. Cardiovascular: Negative. Gastrointestinal: Negative. Skin:        Cyst to tailbone with tenderness   Neurological: Negative. Vitals:    03/03/20 1453   BP: 101/67   Pulse: 94   Resp: 18   Temp: 98.6 °F (37 °C)   SpO2: 98%   Weight: 163 lb 12.8 oz (74.3 kg)   Height: 5' 4\" (1.626 m)       Physical Exam  Constitutional:       Appearance: Normal appearance. Cardiovascular:      Rate and Rhythm: Normal rate and regular rhythm. Pulses: Normal pulses. Heart sounds: Normal heart sounds. Pulmonary:      Effort: Pulmonary effort is normal.      Breath sounds: Normal breath sounds. Skin:     Findings: Erythema present. Comments: Pilonidal cyst with mild erythema but without abscess formation. Neurological:      Mental Status: She is alert and oriented to person, place, and time. Psychiatric:         Mood and Affect: Mood normal.         MDM     Differential Diagnosis; Clinical Impression; Plan:     (L05.91) Pilonidal cyst without abscess  (primary encounter diagnosis)  Orders Placed This Encounter      clindamycin (CLEOCIN) 300 mg capsule          Sig: Take 1 Cap by mouth three (3) times daily for 10 days. Dispense:  30 Cap          Refill:  0    Advised to use warm compress to area. The patients condition was discussed with the patient and they understand. The patient is to follow up with PCP/[facility] if abscess formation start. If signs and symptoms become worse the pt is to go to the ER. The patient is to take medications as prescribed. AVS given with patient instructions upon discharge.                   Procedures

## 2020-03-03 NOTE — PATIENT INSTRUCTIONS
Rash: Care Instructions Your Care Instructions A rash is any irritation or inflammation of the skin. Rashes have many possible causes, including allergy, infection, illness, heat, and emotional stress. Follow-up care is a key part of your treatment and safety. Be sure to make and go to all appointments, and call your doctor if you are having problems. It's also a good idea to know your test results and keep a list of the medicines you take. How can you care for yourself at home? · Wash the area with water only. Soap can make dryness and itching worse. Pat dry. · Put cold, wet cloths on the rash to reduce itching. · Keep cool, and stay out of the sun. · Leave the rash open to the air as much of the time as possible. · Sometimes petroleum jelly (Vaseline) can help relieve the discomfort caused by a rash. A moisturizing lotion, such as Cetaphil, also may help. Calamine lotion may help for rashes caused by contact with something (such as a plant or soap) that irritated the skin. Use it 3 or 4 times a day. · If your doctor prescribed a cream, use it as directed. If your doctor prescribed medicine, take it exactly as directed. · If your rash itches so badly that it interferes with your normal activities, take an over-the-counter antihistamine, such as diphenhydramine (Benadryl) or loratadine (Claritin). Read and follow all instructions on the label. When should you call for help? Call your doctor now or seek immediate medical care if: 
  · You have signs of infection, such as: 
? Increased pain, swelling, warmth, or redness. ? Red streaks leading from the area. ? Pus draining from the area. ? A fever.  
  · You have joint pain along with the rash.  
 Watch closely for changes in your health, and be sure to contact your doctor if: 
  · Your rash is changing or getting worse. For example, call if you have pain along with the rash, the rash is spreading, or you have new blisters.   · You do not get better after 1 week. Where can you learn more? Go to http://adarsh-juan miguel.info/. Enter M680 in the search box to learn more about \"Rash: Care Instructions. \" Current as of: April 1, 2019 Content Version: 12.2 © 5595-9567 [employer]. Care instructions adapted under license by [employer] (which disclaims liability or warranty for this information). If you have questions about a medical condition or this instruction, always ask your healthcare professional. [address] any warranty or liability for your use of this information.

## 2020-03-05 ENCOUNTER — OFFICE VISIT (OUTPATIENT)
Dept: URGENT CARE | Age: 30
End: 2020-03-05

## 2020-03-05 VITALS
WEIGHT: 163 LBS | DIASTOLIC BLOOD PRESSURE: 71 MMHG | BODY MASS INDEX: 27.83 KG/M2 | HEIGHT: 64 IN | OXYGEN SATURATION: 99 % | RESPIRATION RATE: 16 BRPM | HEART RATE: 67 BPM | TEMPERATURE: 98.1 F | SYSTOLIC BLOOD PRESSURE: 109 MMHG

## 2020-03-05 DIAGNOSIS — L05.91 PILONIDAL CYST: Primary | ICD-10-CM

## 2020-03-05 NOTE — PROGRESS NOTES
Abscess   This is a recurrent problem. The current episode started more than 2 days ago. The problem occurs constantly. The problem has been gradually worsening. Pertinent negatives include no chest pain, no abdominal pain and no shortness of breath. Associated symptoms comments: Tailbone pain. The symptoms are aggravated by walking and standing. Nothing relieves the symptoms. She has tried a warm compress for the symptoms. Patient have a recurrent history of pilonidal cyst.   History reviewed. No pertinent past medical history. Past Surgical History:   Procedure Laterality Date    HX HEENT      lasik         History reviewed. No pertinent family history.      Social History     Socioeconomic History    Marital status: SINGLE     Spouse name: Not on file    Number of children: Not on file    Years of education: Not on file    Highest education level: Not on file   Occupational History    Not on file   Social Needs    Financial resource strain: Not on file    Food insecurity:     Worry: Not on file     Inability: Not on file    Transportation needs:     Medical: Not on file     Non-medical: Not on file   Tobacco Use    Smoking status: Current Every Day Smoker    Smokeless tobacco: Never Used   Substance and Sexual Activity    Alcohol use: Not Currently    Drug use: Never    Sexual activity: Not on file   Lifestyle    Physical activity:     Days per week: Not on file     Minutes per session: Not on file    Stress: Not on file   Relationships    Social connections:     Talks on phone: Not on file     Gets together: Not on file     Attends Judaism service: Not on file     Active member of club or organization: Not on file     Attends meetings of clubs or organizations: Not on file     Relationship status: Not on file    Intimate partner violence:     Fear of current or ex partner: Not on file     Emotionally abused: Not on file     Physically abused: Not on file     Forced sexual activity: Not on file   Other Topics Concern    Not on file   Social History Narrative    Not on file                ALLERGIES: Patient has no known allergies. Review of Systems   Constitutional: Negative for chills, fatigue and fever. Respiratory: Negative for chest tightness and shortness of breath. Cardiovascular: Negative for chest pain. Gastrointestinal: Negative for abdominal pain. Musculoskeletal: Negative. Skin:        Cyst on tailbone       Vitals:    03/05/20 0944   BP: 109/71   Pulse: 67   Resp: 16   Temp: 98.1 °F (36.7 °C)   SpO2: 99%   Weight: 163 lb (73.9 kg)   Height: 5' 4\" (1.626 m)       Physical Exam  Constitutional:       Appearance: She is well-developed. Neck:      Musculoskeletal: Normal range of motion. Cardiovascular:      Rate and Rhythm: Normal rate and regular rhythm. Heart sounds: Normal heart sounds. Pulmonary:      Effort: Pulmonary effort is normal.      Breath sounds: Normal breath sounds. Musculoskeletal: Normal range of motion. Lymphadenopathy:      Cervical: No cervical adenopathy. Skin:     General: Skin is warm. Findings: Erythema and lesion present. Comments: Pilonidal cyst    Neurological:      Mental Status: She is alert and oriented to person, place, and time. Psychiatric:         Mood and Affect: Mood normal.         MDM     Differential Diagnosis; Clinical Impression; Plan:     (L05.91) Pilonidal cyst  (primary encounter diagnosis)  No orders of the defined types were placed in this encounter. Advised patient to continue antibiotic that was given 2 days ago. Use warm compress to affected site. The patients condition was discussed with the patient and they understand. The patient is to follow up with PCP. If signs and symptoms become worse the pt is to go to the ER. The patient is to take medications as prescribed. AVS given with patient instructions upon discharge.                   I&D Abcess Simple  Consent: Verbal consent obtained. Consent given by: patient  Date/Time: 3/5/2020 10:42 AM  Performed by: NPPreparation: skin prepped with ChloraPrep  Location: [facility]. Anesthesia:  Local Anesthetic: lidocaine 1% with epinephrine  Scalpel size: 11  Complexity: simple  Drainage: purulent  Drainage amount: moderate  Wound treatment: wound left open  Post-procedure: dressing applied (with antibiotic ointment)  Patient tolerance: Patient tolerated the procedure well with no immediate complications  My total time at bedside, performing this procedure was 1-15 minutes.

## 2020-03-05 NOTE — PATIENT INSTRUCTIONS
Pilonidal Abscess: Care Instructions Your Care Instructions A pilonidal abscess is an infection caused by an ingrown hair. The abscess occurs in the area of the tailbone and the top of the buttocks. The infection causes a pocket of pus to form. It can be quite painful. Your doctor may have opened and drained the abscess. You can take care of yourself at home to help the area heal. In some cases, the abscess returns. Your doctor may suggest surgery to remove the site of the infection if it comes back. You may have had a sedative to help you relax. You may be unsteady after having sedation. It can take a few hours for the medicine's effects to wear off. Common side effects of sedation include nausea, vomiting, and feeling sleepy or tired. The doctor has checked you carefully, but problems can develop later. If you notice any problems or new symptoms, get medical treatment right away. Follow-up care is a key part of your treatment and safety. Be sure to make and go to all appointments, and call your doctor if you are having problems. It's also a good idea to know your test results and keep a list of the medicines you take. How can you care for yourself at home? · If the doctor gave you a sedative: ? For 24 hours, don't do anything that requires attention to detail. This includes going to work, making important decisions, or signing any legal documents. It takes time for the medicine's effects to completely wear off. 
? For your safety, do not drive or operate any machinery that could be dangerous. Wait until the medicine wears off and you can think clearly and react easily. · If your doctor prescribed antibiotics, take them exactly as directed. Do not stop taking them just because you feel better. You need to take the full course of antibiotics. · Be safe with medicines. Take pain medicines exactly as directed. ? If the doctor gave you a prescription medicine for pain, take it as prescribed. ? If you are not taking a prescription pain medicine, ask your doctor if you can take an over-the-counter medicine. · If your doctor opened and drained your abscess, you may have gauze or other packing material inside your wound. Follow all instructions from your doctor on how to care for your wound. · Keep the area of your wound very clean. Use wet cotton balls, a warm washcloth, or baby wipes. Clean the area gently, especially after a bowel movement. When should you call for help? Call 911 anytime you think you may need emergency care. For example, call if: 
  · You have trouble breathing.  
  · You passed out (lost consciousness).  
 Call your doctor now or seek immediate medical care if: 
  · You have new or worse nausea or vomiting.  
  · You have symptoms of infection, such as: 
? Increased pain, swelling, warmth, or redness. ? Red streaks leading from the area. ? Pus draining from the area. ? A fever.  
 Watch closely for changes in your health, and be sure to contact your doctor if: 
  · You do not get better as expected. Where can you learn more? Go to http://adarsh-juan miguel.info/. Enter 22 [id number] in the search box to learn more about \"Pilonidal Abscess: Care Instructions. \" Current as of: April 1, 2019 Content Version: 12.2 © 3900-7496 [employer]. Care instructions adapted under license by [employer] (which disclaims liability or warranty for this information). If you have questions about a medical condition or this instruction, always ask your healthcare professional. [place] [address] any warranty or liability for your use of this information.

## 2020-03-21 RX ORDER — CLINDAMYCIN HYDROCHLORIDE 300 MG/1
300 CAPSULE ORAL 3 TIMES DAILY
Qty: 30 CAP | Refills: 0 | Status: SHIPPED | OUTPATIENT
Start: 2020-03-21 | End: 2020-03-21 | Stop reason: SDUPTHER

## 2020-03-21 RX ORDER — CLINDAMYCIN HYDROCHLORIDE 300 MG/1
300 CAPSULE ORAL 3 TIMES DAILY
Qty: 30 CAP | Refills: 0 | Status: SHIPPED | OUTPATIENT
Start: 2020-03-21 | End: 2020-03-31

## 2020-03-21 RX ORDER — CEPHALEXIN 500 MG/1
500 CAPSULE ORAL 3 TIMES DAILY
Qty: 30 CAP | Refills: 0 | Status: SHIPPED | OUTPATIENT
Start: 2020-03-21 | End: 2020-03-21 | Stop reason: SDUPTHER

## 2020-03-21 RX ORDER — CEPHALEXIN 500 MG/1
500 CAPSULE ORAL 3 TIMES DAILY
Qty: 30 CAP | Refills: 0 | Status: SHIPPED | OUTPATIENT
Start: 2020-03-21 | End: 2020-03-31

## 2020-03-21 NOTE — PROGRESS NOTES
Pt with c/o recurrent pilonedal cyst requesting refill of antibx. MD Collin Christianson made aware, will send refill to pharmacy. Pt instructed that with worsening symptoms she needs to go to ER. Pt verbalized understanding.

## 2020-08-12 ENCOUNTER — ANESTHESIA (OUTPATIENT)
Dept: SURGERY | Age: 30
End: 2020-08-12
Payer: COMMERCIAL

## 2020-08-12 ENCOUNTER — HOSPITAL ENCOUNTER (OUTPATIENT)
Age: 30
Setting detail: OBSERVATION
Discharge: HOME | End: 2020-08-13
Attending: EMERGENCY MEDICINE | Admitting: SURGERY
Payer: COMMERCIAL

## 2020-08-12 ENCOUNTER — ANESTHESIA EVENT (OUTPATIENT)
Dept: SURGERY | Age: 30
End: 2020-08-12
Payer: COMMERCIAL

## 2020-08-12 ENCOUNTER — OFFICE VISIT (OUTPATIENT)
Dept: SURGERY | Age: 30
End: 2020-08-12

## 2020-08-12 ENCOUNTER — NURSE TRIAGE (OUTPATIENT)
Dept: OTHER | Facility: CLINIC | Age: 30
End: 2020-08-12

## 2020-08-12 ENCOUNTER — APPOINTMENT (OUTPATIENT)
Dept: CT IMAGING | Age: 30
End: 2020-08-12
Attending: EMERGENCY MEDICINE
Payer: COMMERCIAL

## 2020-08-12 VITALS
RESPIRATION RATE: 28 BRPM | DIASTOLIC BLOOD PRESSURE: 73 MMHG | WEIGHT: 166 LBS | SYSTOLIC BLOOD PRESSURE: 124 MMHG | HEART RATE: 75 BPM | TEMPERATURE: 98.5 F | OXYGEN SATURATION: 98 % | BODY MASS INDEX: 28.34 KG/M2 | HEIGHT: 64 IN

## 2020-08-12 DIAGNOSIS — K35.80 ACUTE APPENDICITIS, UNSPECIFIED ACUTE APPENDICITIS TYPE: Primary | ICD-10-CM

## 2020-08-12 DIAGNOSIS — K35.30 ACUTE APPENDICITIS WITH LOCALIZED PERITONITIS, WITHOUT PERFORATION, ABSCESS, OR GANGRENE: ICD-10-CM

## 2020-08-12 DIAGNOSIS — L05.91 PILONIDAL CYST: Primary | ICD-10-CM

## 2020-08-12 PROBLEM — K37 APPENDICITIS: Status: ACTIVE | Noted: 2020-08-12

## 2020-08-12 LAB
ALBUMIN SERPL-MCNC: 4.6 G/DL (ref 3.5–5)
ALBUMIN/GLOB SERPL: 1.1 {RATIO} (ref 1.1–2.2)
ALP SERPL-CCNC: 85 U/L (ref 45–117)
ALT SERPL-CCNC: 19 U/L (ref 12–78)
ANION GAP SERPL CALC-SCNC: 11 MMOL/L (ref 5–15)
APPEARANCE UR: CLEAR
AST SERPL-CCNC: 15 U/L (ref 15–37)
BACTERIA URNS QL MICRO: ABNORMAL /HPF
BASOPHILS # BLD: 0 K/UL (ref 0–0.1)
BASOPHILS NFR BLD: 0 % (ref 0–1)
BILIRUB SERPL-MCNC: 0.5 MG/DL (ref 0.2–1)
BILIRUB UR QL: NEGATIVE
BUN SERPL-MCNC: 13 MG/DL (ref 6–20)
BUN/CREAT SERPL: 17 (ref 12–20)
CALCIUM SERPL-MCNC: 10.7 MG/DL (ref 8.5–10.1)
CHLORIDE SERPL-SCNC: 106 MMOL/L (ref 97–108)
CO2 SERPL-SCNC: 19 MMOL/L (ref 21–32)
COLOR UR: ABNORMAL
COMMENT, HOLDF: NORMAL
CREAT SERPL-MCNC: 0.76 MG/DL (ref 0.55–1.02)
DIFFERENTIAL METHOD BLD: ABNORMAL
EOSINOPHIL # BLD: 0 K/UL (ref 0–0.4)
EOSINOPHIL NFR BLD: 0 % (ref 0–7)
EPITH CASTS URNS QL MICRO: ABNORMAL /LPF
ERYTHROCYTE [DISTWIDTH] IN BLOOD BY AUTOMATED COUNT: 12.4 % (ref 11.5–14.5)
GLOBULIN SER CALC-MCNC: 4.2 G/DL (ref 2–4)
GLUCOSE SERPL-MCNC: 99 MG/DL (ref 65–100)
GLUCOSE UR STRIP.AUTO-MCNC: NEGATIVE MG/DL
HCG SERPL QL: NEGATIVE
HCG UR QL: NEGATIVE
HCT VFR BLD AUTO: 40.5 % (ref 35–47)
HGB BLD-MCNC: 13.8 G/DL (ref 11.5–16)
HGB UR QL STRIP: NEGATIVE
IMM GRANULOCYTES # BLD AUTO: 0.1 K/UL (ref 0–0.04)
IMM GRANULOCYTES NFR BLD AUTO: 1 % (ref 0–0.5)
KETONES UR QL STRIP.AUTO: >80 MG/DL
LEUKOCYTE ESTERASE UR QL STRIP.AUTO: NEGATIVE
LIPASE SERPL-CCNC: 125 U/L (ref 73–393)
LYMPHOCYTES # BLD: 1.7 K/UL (ref 0.8–3.5)
LYMPHOCYTES NFR BLD: 11 % (ref 12–49)
MCH RBC QN AUTO: 30.4 PG (ref 26–34)
MCHC RBC AUTO-ENTMCNC: 34.1 G/DL (ref 30–36.5)
MCV RBC AUTO: 89.2 FL (ref 80–99)
MONOCYTES # BLD: 1 K/UL (ref 0–1)
MONOCYTES NFR BLD: 6 % (ref 5–13)
NEUTS SEG # BLD: 12.9 K/UL (ref 1.8–8)
NEUTS SEG NFR BLD: 82 % (ref 32–75)
NITRITE UR QL STRIP.AUTO: NEGATIVE
NRBC # BLD: 0 K/UL (ref 0–0.01)
NRBC BLD-RTO: 0 PER 100 WBC
PH UR STRIP: 6 [PH] (ref 5–8)
PLATELET # BLD AUTO: 340 K/UL (ref 150–400)
PMV BLD AUTO: 8.9 FL (ref 8.9–12.9)
POTASSIUM SERPL-SCNC: 3.5 MMOL/L (ref 3.5–5.1)
PROT SERPL-MCNC: 8.8 G/DL (ref 6.4–8.2)
PROT UR STRIP-MCNC: NEGATIVE MG/DL
RBC # BLD AUTO: 4.54 M/UL (ref 3.8–5.2)
RBC #/AREA URNS HPF: ABNORMAL /HPF (ref 0–5)
SAMPLES BEING HELD,HOLD: NORMAL
SODIUM SERPL-SCNC: 136 MMOL/L (ref 136–145)
SP GR UR REFRACTOMETRY: 1.02 (ref 1–1.03)
UR CULT HOLD, URHOLD: NORMAL
UROBILINOGEN UR QL STRIP.AUTO: 0.2 EU/DL (ref 0.2–1)
WBC # BLD AUTO: 15.7 K/UL (ref 3.6–11)
WBC URNS QL MICRO: ABNORMAL /HPF (ref 0–4)

## 2020-08-12 PROCEDURE — 74011250636 HC RX REV CODE- 250/636: Performed by: ANESTHESIOLOGY

## 2020-08-12 PROCEDURE — 81025 URINE PREGNANCY TEST: CPT

## 2020-08-12 PROCEDURE — 77030020829: Performed by: SURGERY

## 2020-08-12 PROCEDURE — 99218 HC RM OBSERVATION: CPT

## 2020-08-12 PROCEDURE — 36415 COLL VENOUS BLD VENIPUNCTURE: CPT

## 2020-08-12 PROCEDURE — 74011000258 HC RX REV CODE- 258: Performed by: SURGERY

## 2020-08-12 PROCEDURE — 74176 CT ABD & PELVIS W/O CONTRAST: CPT

## 2020-08-12 PROCEDURE — 99219 PR INITIAL OBSERVATION CARE/DAY 50 MINUTES: CPT | Performed by: SURGERY

## 2020-08-12 PROCEDURE — 99202 OFFICE O/P NEW SF 15 MIN: CPT | Performed by: SURGERY

## 2020-08-12 PROCEDURE — 77030036731 HC STPLR ENDOSC J&J -F: Performed by: SURGERY

## 2020-08-12 PROCEDURE — 74011250636 HC RX REV CODE- 250/636: Performed by: SURGERY

## 2020-08-12 PROCEDURE — 77030026438 HC STYL ET INTUB CARD -A: Performed by: ANESTHESIOLOGY

## 2020-08-12 PROCEDURE — 88304 TISSUE EXAM BY PATHOLOGIST: CPT

## 2020-08-12 PROCEDURE — 77030002933 HC SUT MCRYL J&J -A: Performed by: SURGERY

## 2020-08-12 PROCEDURE — 96374 THER/PROPH/DIAG INJ IV PUSH: CPT

## 2020-08-12 PROCEDURE — 74011250636 HC RX REV CODE- 250/636: Performed by: NURSE PRACTITIONER

## 2020-08-12 PROCEDURE — 74011000258 HC RX REV CODE- 258: Performed by: EMERGENCY MEDICINE

## 2020-08-12 PROCEDURE — 77030008606 HC TRCR ENDOSC KII AMR -B: Performed by: SURGERY

## 2020-08-12 PROCEDURE — 77030040922 HC BLNKT HYPOTHRM STRY -A

## 2020-08-12 PROCEDURE — 77030009963 HC RELD STPLR ECR J&J -C: Performed by: SURGERY

## 2020-08-12 PROCEDURE — 77030003666 HC NDL SPINAL BD -A: Performed by: SURGERY

## 2020-08-12 PROCEDURE — 77030012770 HC TRCR OPT FX AMR -B: Performed by: SURGERY

## 2020-08-12 PROCEDURE — 77030007955 HC PCH ENDOSC SPEC J&J -B: Performed by: SURGERY

## 2020-08-12 PROCEDURE — 76060000033 HC ANESTHESIA 1 TO 1.5 HR: Performed by: SURGERY

## 2020-08-12 PROCEDURE — 84703 CHORIONIC GONADOTROPIN ASSAY: CPT

## 2020-08-12 PROCEDURE — 83690 ASSAY OF LIPASE: CPT

## 2020-08-12 PROCEDURE — 77030008684 HC TU ET CUF COVD -B: Performed by: ANESTHESIOLOGY

## 2020-08-12 PROCEDURE — 81001 URINALYSIS AUTO W/SCOPE: CPT

## 2020-08-12 PROCEDURE — 74011250636 HC RX REV CODE- 250/636: Performed by: EMERGENCY MEDICINE

## 2020-08-12 PROCEDURE — 77030011640 HC PAD GRND REM COVD -A: Performed by: SURGERY

## 2020-08-12 PROCEDURE — 85025 COMPLETE CBC W/AUTO DIFF WBC: CPT

## 2020-08-12 PROCEDURE — 74011000250 HC RX REV CODE- 250: Performed by: SURGERY

## 2020-08-12 PROCEDURE — 74011250637 HC RX REV CODE- 250/637: Performed by: NURSE PRACTITIONER

## 2020-08-12 PROCEDURE — 77030003578 HC NDL INSUF VERES AMR -B: Performed by: SURGERY

## 2020-08-12 PROCEDURE — 74011250637 HC RX REV CODE- 250/637: Performed by: SURGERY

## 2020-08-12 PROCEDURE — 80053 COMPREHEN METABOLIC PANEL: CPT

## 2020-08-12 PROCEDURE — 74011250637 HC RX REV CODE- 250/637: Performed by: ANESTHESIOLOGY

## 2020-08-12 PROCEDURE — 74011000250 HC RX REV CODE- 250: Performed by: NURSE PRACTITIONER

## 2020-08-12 PROCEDURE — 96375 TX/PRO/DX INJ NEW DRUG ADDON: CPT

## 2020-08-12 PROCEDURE — 76010000138 HC OR TIME 0.5 TO 1 HR: Performed by: SURGERY

## 2020-08-12 PROCEDURE — 99284 EMERGENCY DEPT VISIT MOD MDM: CPT

## 2020-08-12 PROCEDURE — 77030031139 HC SUT VCRL2 J&J -A: Performed by: SURGERY

## 2020-08-12 PROCEDURE — 76210000017 HC OR PH I REC 1.5 TO 2 HR: Performed by: SURGERY

## 2020-08-12 RX ORDER — SODIUM CHLORIDE 9 MG/ML
50 INJECTION, SOLUTION INTRAVENOUS CONTINUOUS
Status: DISCONTINUED | OUTPATIENT
Start: 2020-08-12 | End: 2020-08-12 | Stop reason: HOSPADM

## 2020-08-12 RX ORDER — MIDAZOLAM HYDROCHLORIDE 1 MG/ML
INJECTION, SOLUTION INTRAMUSCULAR; INTRAVENOUS AS NEEDED
Status: DISCONTINUED | OUTPATIENT
Start: 2020-08-12 | End: 2020-08-12 | Stop reason: HOSPADM

## 2020-08-12 RX ORDER — SODIUM CHLORIDE, SODIUM LACTATE, POTASSIUM CHLORIDE, CALCIUM CHLORIDE 600; 310; 30; 20 MG/100ML; MG/100ML; MG/100ML; MG/100ML
INJECTION, SOLUTION INTRAVENOUS
Status: DISCONTINUED | OUTPATIENT
Start: 2020-08-12 | End: 2020-08-12 | Stop reason: HOSPADM

## 2020-08-12 RX ORDER — FENTANYL CITRATE 50 UG/ML
25 INJECTION, SOLUTION INTRAMUSCULAR; INTRAVENOUS
Status: COMPLETED | OUTPATIENT
Start: 2020-08-12 | End: 2020-08-12

## 2020-08-12 RX ORDER — BUPIVACAINE HYDROCHLORIDE AND EPINEPHRINE 5; 5 MG/ML; UG/ML
30 INJECTION, SOLUTION EPIDURAL; INTRACAUDAL; PERINEURAL ONCE
Status: COMPLETED | OUTPATIENT
Start: 2020-08-12 | End: 2020-08-12

## 2020-08-12 RX ORDER — SCOLOPAMINE TRANSDERMAL SYSTEM 1 MG/1
PATCH, EXTENDED RELEASE TRANSDERMAL AS NEEDED
Status: DISCONTINUED | OUTPATIENT
Start: 2020-08-12 | End: 2020-08-12 | Stop reason: HOSPADM

## 2020-08-12 RX ORDER — MORPHINE SULFATE 10 MG/ML
2 INJECTION, SOLUTION INTRAMUSCULAR; INTRAVENOUS
Status: DISCONTINUED | OUTPATIENT
Start: 2020-08-12 | End: 2020-08-12 | Stop reason: HOSPADM

## 2020-08-12 RX ORDER — HYDROMORPHONE HYDROCHLORIDE 1 MG/ML
0.2 INJECTION, SOLUTION INTRAMUSCULAR; INTRAVENOUS; SUBCUTANEOUS
Status: DISCONTINUED | OUTPATIENT
Start: 2020-08-12 | End: 2020-08-12 | Stop reason: HOSPADM

## 2020-08-12 RX ORDER — ONDANSETRON 2 MG/ML
4 INJECTION INTRAMUSCULAR; INTRAVENOUS
Status: COMPLETED | OUTPATIENT
Start: 2020-08-12 | End: 2020-08-12

## 2020-08-12 RX ORDER — DEXAMETHASONE SODIUM PHOSPHATE 4 MG/ML
INJECTION, SOLUTION INTRA-ARTICULAR; INTRALESIONAL; INTRAMUSCULAR; INTRAVENOUS; SOFT TISSUE AS NEEDED
Status: DISCONTINUED | OUTPATIENT
Start: 2020-08-12 | End: 2020-08-12 | Stop reason: HOSPADM

## 2020-08-12 RX ORDER — SODIUM CHLORIDE 0.9 % (FLUSH) 0.9 %
5-40 SYRINGE (ML) INJECTION AS NEEDED
Status: DISCONTINUED | OUTPATIENT
Start: 2020-08-12 | End: 2020-08-12 | Stop reason: HOSPADM

## 2020-08-12 RX ORDER — FENTANYL CITRATE 50 UG/ML
50 INJECTION, SOLUTION INTRAMUSCULAR; INTRAVENOUS AS NEEDED
Status: DISCONTINUED | OUTPATIENT
Start: 2020-08-12 | End: 2020-08-12 | Stop reason: HOSPADM

## 2020-08-12 RX ORDER — SODIUM CHLORIDE 9 MG/ML
1000 INJECTION, SOLUTION INTRAVENOUS CONTINUOUS
Status: DISCONTINUED | OUTPATIENT
Start: 2020-08-12 | End: 2020-08-12 | Stop reason: HOSPADM

## 2020-08-12 RX ORDER — DIPHENHYDRAMINE HYDROCHLORIDE 50 MG/ML
12.5 INJECTION, SOLUTION INTRAMUSCULAR; INTRAVENOUS
Status: DISCONTINUED | OUTPATIENT
Start: 2020-08-12 | End: 2020-08-13 | Stop reason: HOSPADM

## 2020-08-12 RX ORDER — PROPOFOL 10 MG/ML
INJECTION, EMULSION INTRAVENOUS AS NEEDED
Status: DISCONTINUED | OUTPATIENT
Start: 2020-08-12 | End: 2020-08-12 | Stop reason: HOSPADM

## 2020-08-12 RX ORDER — SODIUM CHLORIDE 9 MG/ML
100 INJECTION, SOLUTION INTRAVENOUS CONTINUOUS
Status: DISCONTINUED | OUTPATIENT
Start: 2020-08-12 | End: 2020-08-13 | Stop reason: HOSPADM

## 2020-08-12 RX ORDER — SODIUM CHLORIDE, SODIUM LACTATE, POTASSIUM CHLORIDE, CALCIUM CHLORIDE 600; 310; 30; 20 MG/100ML; MG/100ML; MG/100ML; MG/100ML
125 INJECTION, SOLUTION INTRAVENOUS CONTINUOUS
Status: DISCONTINUED | OUTPATIENT
Start: 2020-08-12 | End: 2020-08-12 | Stop reason: HOSPADM

## 2020-08-12 RX ORDER — MIDAZOLAM HYDROCHLORIDE 1 MG/ML
0.5 INJECTION, SOLUTION INTRAMUSCULAR; INTRAVENOUS
Status: DISCONTINUED | OUTPATIENT
Start: 2020-08-12 | End: 2020-08-12 | Stop reason: HOSPADM

## 2020-08-12 RX ORDER — SODIUM CHLORIDE 9 MG/ML
150 INJECTION, SOLUTION INTRAVENOUS ONCE
Status: COMPLETED | OUTPATIENT
Start: 2020-08-12 | End: 2020-08-12

## 2020-08-12 RX ORDER — ACETAMINOPHEN 325 MG/1
650 TABLET ORAL
Status: DISCONTINUED | OUTPATIENT
Start: 2020-08-12 | End: 2020-08-13 | Stop reason: HOSPADM

## 2020-08-12 RX ORDER — SODIUM CHLORIDE 0.9 % (FLUSH) 0.9 %
5-40 SYRINGE (ML) INJECTION EVERY 8 HOURS
Status: DISCONTINUED | OUTPATIENT
Start: 2020-08-12 | End: 2020-08-12 | Stop reason: HOSPADM

## 2020-08-12 RX ORDER — ONDANSETRON 4 MG/1
4 TABLET, ORALLY DISINTEGRATING ORAL
Qty: 10 TAB | Refills: 0 | Status: SHIPPED | OUTPATIENT
Start: 2020-08-12 | End: 2021-10-07

## 2020-08-12 RX ORDER — KETOROLAC TROMETHAMINE 30 MG/ML
INJECTION, SOLUTION INTRAMUSCULAR; INTRAVENOUS AS NEEDED
Status: DISCONTINUED | OUTPATIENT
Start: 2020-08-12 | End: 2020-08-12 | Stop reason: HOSPADM

## 2020-08-12 RX ORDER — OXYCODONE HYDROCHLORIDE 5 MG/1
5 TABLET ORAL
Qty: 12 TAB | Refills: 0 | OUTPATIENT
Start: 2020-08-12 | End: 2020-08-12

## 2020-08-12 RX ORDER — ROCURONIUM BROMIDE 10 MG/ML
INJECTION, SOLUTION INTRAVENOUS AS NEEDED
Status: DISCONTINUED | OUTPATIENT
Start: 2020-08-12 | End: 2020-08-12 | Stop reason: HOSPADM

## 2020-08-12 RX ORDER — OXYCODONE HYDROCHLORIDE 5 MG/1
5 TABLET ORAL
Qty: 12 TAB | Refills: 0 | Status: SHIPPED | OUTPATIENT
Start: 2020-08-12 | End: 2020-08-16

## 2020-08-12 RX ORDER — MIDAZOLAM HYDROCHLORIDE 1 MG/ML
1 INJECTION, SOLUTION INTRAMUSCULAR; INTRAVENOUS AS NEEDED
Status: DISCONTINUED | OUTPATIENT
Start: 2020-08-12 | End: 2020-08-12 | Stop reason: HOSPADM

## 2020-08-12 RX ORDER — SUCCINYLCHOLINE CHLORIDE 20 MG/ML
INJECTION INTRAMUSCULAR; INTRAVENOUS AS NEEDED
Status: DISCONTINUED | OUTPATIENT
Start: 2020-08-12 | End: 2020-08-12 | Stop reason: HOSPADM

## 2020-08-12 RX ORDER — OXYCODONE HYDROCHLORIDE 5 MG/1
5 TABLET ORAL
Status: DISCONTINUED | OUTPATIENT
Start: 2020-08-12 | End: 2020-08-13 | Stop reason: HOSPADM

## 2020-08-12 RX ORDER — HYDROMORPHONE HYDROCHLORIDE 2 MG/ML
INJECTION, SOLUTION INTRAMUSCULAR; INTRAVENOUS; SUBCUTANEOUS AS NEEDED
Status: DISCONTINUED | OUTPATIENT
Start: 2020-08-12 | End: 2020-08-12 | Stop reason: HOSPADM

## 2020-08-12 RX ORDER — ONDANSETRON 2 MG/ML
4 INJECTION INTRAMUSCULAR; INTRAVENOUS AS NEEDED
Status: DISCONTINUED | OUTPATIENT
Start: 2020-08-12 | End: 2020-08-12 | Stop reason: HOSPADM

## 2020-08-12 RX ORDER — KETOROLAC TROMETHAMINE 30 MG/ML
15 INJECTION, SOLUTION INTRAMUSCULAR; INTRAVENOUS
Status: COMPLETED | OUTPATIENT
Start: 2020-08-12 | End: 2020-08-12

## 2020-08-12 RX ORDER — LIDOCAINE HYDROCHLORIDE 20 MG/ML
INJECTION, SOLUTION EPIDURAL; INFILTRATION; INTRACAUDAL; PERINEURAL AS NEEDED
Status: DISCONTINUED | OUTPATIENT
Start: 2020-08-12 | End: 2020-08-12 | Stop reason: HOSPADM

## 2020-08-12 RX ORDER — HYDROMORPHONE HYDROCHLORIDE 1 MG/ML
0.5 INJECTION, SOLUTION INTRAMUSCULAR; INTRAVENOUS; SUBCUTANEOUS
Status: DISCONTINUED | OUTPATIENT
Start: 2020-08-12 | End: 2020-08-13 | Stop reason: HOSPADM

## 2020-08-12 RX ORDER — LIDOCAINE HYDROCHLORIDE 10 MG/ML
0.1 INJECTION, SOLUTION EPIDURAL; INFILTRATION; INTRACAUDAL; PERINEURAL AS NEEDED
Status: DISCONTINUED | OUTPATIENT
Start: 2020-08-12 | End: 2020-08-12 | Stop reason: HOSPADM

## 2020-08-12 RX ORDER — DEXMEDETOMIDINE HYDROCHLORIDE 100 UG/ML
INJECTION, SOLUTION INTRAVENOUS AS NEEDED
Status: DISCONTINUED | OUTPATIENT
Start: 2020-08-12 | End: 2020-08-12 | Stop reason: HOSPADM

## 2020-08-12 RX ORDER — ONDANSETRON 2 MG/ML
INJECTION INTRAMUSCULAR; INTRAVENOUS AS NEEDED
Status: DISCONTINUED | OUTPATIENT
Start: 2020-08-12 | End: 2020-08-12 | Stop reason: HOSPADM

## 2020-08-12 RX ORDER — FENTANYL CITRATE 50 UG/ML
INJECTION, SOLUTION INTRAMUSCULAR; INTRAVENOUS AS NEEDED
Status: DISCONTINUED | OUTPATIENT
Start: 2020-08-12 | End: 2020-08-12 | Stop reason: HOSPADM

## 2020-08-12 RX ORDER — OXYCODONE HYDROCHLORIDE 5 MG/1
10 TABLET ORAL
Status: DISCONTINUED | OUTPATIENT
Start: 2020-08-12 | End: 2020-08-13 | Stop reason: HOSPADM

## 2020-08-12 RX ORDER — EPHEDRINE SULFATE/0.9% NACL/PF 50 MG/5 ML
5 SYRINGE (ML) INTRAVENOUS AS NEEDED
Status: DISCONTINUED | OUTPATIENT
Start: 2020-08-12 | End: 2020-08-12 | Stop reason: HOSPADM

## 2020-08-12 RX ORDER — KETAMINE HYDROCHLORIDE 10 MG/ML
INJECTION, SOLUTION INTRAMUSCULAR; INTRAVENOUS AS NEEDED
Status: DISCONTINUED | OUTPATIENT
Start: 2020-08-12 | End: 2020-08-12 | Stop reason: HOSPADM

## 2020-08-12 RX ORDER — DIPHENHYDRAMINE HYDROCHLORIDE 50 MG/ML
12.5 INJECTION, SOLUTION INTRAMUSCULAR; INTRAVENOUS AS NEEDED
Status: DISCONTINUED | OUTPATIENT
Start: 2020-08-12 | End: 2020-08-12 | Stop reason: HOSPADM

## 2020-08-12 RX ORDER — PROPOFOL 10 MG/ML
INJECTION, EMULSION INTRAVENOUS
Status: DISCONTINUED | OUTPATIENT
Start: 2020-08-12 | End: 2020-08-12 | Stop reason: HOSPADM

## 2020-08-12 RX ORDER — OXYCODONE AND ACETAMINOPHEN 5; 325 MG/1; MG/1
1 TABLET ORAL AS NEEDED
Status: DISCONTINUED | OUTPATIENT
Start: 2020-08-12 | End: 2020-08-12

## 2020-08-12 RX ORDER — ONDANSETRON 2 MG/ML
4 INJECTION INTRAMUSCULAR; INTRAVENOUS
Status: DISCONTINUED | OUTPATIENT
Start: 2020-08-12 | End: 2020-08-13 | Stop reason: HOSPADM

## 2020-08-12 RX ORDER — ACETAMINOPHEN 325 MG/1
650 TABLET ORAL ONCE
Status: COMPLETED | OUTPATIENT
Start: 2020-08-12 | End: 2020-08-12

## 2020-08-12 RX ADMIN — DEXMEDETOMIDINE HYDROCHLORIDE 8 MCG: 100 INJECTION, SOLUTION, CONCENTRATE INTRAVENOUS at 20:02

## 2020-08-12 RX ADMIN — SODIUM CHLORIDE, SODIUM LACTATE, POTASSIUM CHLORIDE, AND CALCIUM CHLORIDE 125 ML/HR: 600; 310; 30; 20 INJECTION, SOLUTION INTRAVENOUS at 19:26

## 2020-08-12 RX ADMIN — FENTANYL CITRATE 25 MCG: 50 INJECTION, SOLUTION INTRAMUSCULAR; INTRAVENOUS at 20:56

## 2020-08-12 RX ADMIN — SODIUM CHLORIDE 1000 ML: 9 INJECTION, SOLUTION INTRAVENOUS at 16:14

## 2020-08-12 RX ADMIN — SODIUM CHLORIDE 100 ML/HR: 900 INJECTION, SOLUTION INTRAVENOUS at 22:21

## 2020-08-12 RX ADMIN — PIPERACILLIN AND TAZOBACTAM 3.38 G: 3; .375 INJECTION, POWDER, LYOPHILIZED, FOR SOLUTION INTRAVENOUS at 23:48

## 2020-08-12 RX ADMIN — FENTANYL CITRATE 100 MCG: 50 INJECTION, SOLUTION INTRAMUSCULAR; INTRAVENOUS at 19:38

## 2020-08-12 RX ADMIN — HYDROMORPHONE HYDROCHLORIDE 0.5 MG: 2 INJECTION, SOLUTION INTRAMUSCULAR; INTRAVENOUS; SUBCUTANEOUS at 20:17

## 2020-08-12 RX ADMIN — MIDAZOLAM 0.5 MG: 1 INJECTION INTRAMUSCULAR; INTRAVENOUS at 21:33

## 2020-08-12 RX ADMIN — PIPERACILLIN AND TAZOBACTAM 3.38 G: 3; .375 INJECTION, POWDER, LYOPHILIZED, FOR SOLUTION INTRAVENOUS at 18:19

## 2020-08-12 RX ADMIN — SODIUM CHLORIDE 150 ML/HR: 900 INJECTION, SOLUTION INTRAVENOUS at 20:39

## 2020-08-12 RX ADMIN — LIDOCAINE HYDROCHLORIDE 100 MG: 20 INJECTION, SOLUTION EPIDURAL; INFILTRATION; INTRACAUDAL; PERINEURAL at 19:38

## 2020-08-12 RX ADMIN — DEXMEDETOMIDINE HYDROCHLORIDE 8 MCG: 100 INJECTION, SOLUTION, CONCENTRATE INTRAVENOUS at 19:48

## 2020-08-12 RX ADMIN — ACETAMINOPHEN 650 MG: 325 TABLET ORAL at 19:26

## 2020-08-12 RX ADMIN — ONDANSETRON 4 MG: 2 INJECTION INTRAMUSCULAR; INTRAVENOUS at 15:16

## 2020-08-12 RX ADMIN — KETAMINE HYDROCHLORIDE 20 MG: 10 INJECTION, SOLUTION INTRAMUSCULAR; INTRAVENOUS at 20:02

## 2020-08-12 RX ADMIN — HYDROMORPHONE HYDROCHLORIDE 0.2 MG: 1 INJECTION, SOLUTION INTRAMUSCULAR; INTRAVENOUS; SUBCUTANEOUS at 21:31

## 2020-08-12 RX ADMIN — FENTANYL CITRATE 25 MCG: 50 INJECTION, SOLUTION INTRAMUSCULAR; INTRAVENOUS at 21:00

## 2020-08-12 RX ADMIN — SCOPALAMINE 1 PATCH: 1 PATCH, EXTENDED RELEASE TRANSDERMAL at 19:28

## 2020-08-12 RX ADMIN — SODIUM CHLORIDE, SODIUM LACTATE, POTASSIUM CHLORIDE, AND CALCIUM CHLORIDE: 600; 310; 30; 20 INJECTION, SOLUTION INTRAVENOUS at 19:34

## 2020-08-12 RX ADMIN — DEXAMETHASONE SODIUM PHOSPHATE 4 MG: 4 INJECTION, SOLUTION INTRAMUSCULAR; INTRAVENOUS at 19:42

## 2020-08-12 RX ADMIN — KETOROLAC TROMETHAMINE 15 MG: 30 INJECTION, SOLUTION INTRAMUSCULAR at 15:19

## 2020-08-12 RX ADMIN — ONDANSETRON HYDROCHLORIDE 4 MG: 2 INJECTION, SOLUTION INTRAMUSCULAR; INTRAVENOUS at 19:42

## 2020-08-12 RX ADMIN — SUCCINYLCHOLINE CHLORIDE 180 MG: 20 INJECTION, SOLUTION INTRAMUSCULAR; INTRAVENOUS at 19:38

## 2020-08-12 RX ADMIN — KETOROLAC TROMETHAMINE 30 MG: 30 INJECTION, SOLUTION INTRAMUSCULAR; INTRAVENOUS at 20:14

## 2020-08-12 RX ADMIN — OXYCODONE HYDROCHLORIDE 5 MG: 5 TABLET ORAL at 23:48

## 2020-08-12 RX ADMIN — PROPOFOL 75 MCG/KG/MIN: 10 INJECTION, EMULSION INTRAVENOUS at 19:42

## 2020-08-12 RX ADMIN — SUGAMMADEX 151 MG: 100 INJECTION, SOLUTION INTRAVENOUS at 20:14

## 2020-08-12 RX ADMIN — HYDROMORPHONE HYDROCHLORIDE 0.2 MG: 1 INJECTION, SOLUTION INTRAMUSCULAR; INTRAVENOUS; SUBCUTANEOUS at 21:47

## 2020-08-12 RX ADMIN — MIDAZOLAM 0.5 MG: 1 INJECTION INTRAMUSCULAR; INTRAVENOUS at 22:22

## 2020-08-12 RX ADMIN — MIDAZOLAM HYDROCHLORIDE 2 MG: 1 INJECTION, SOLUTION INTRAMUSCULAR; INTRAVENOUS at 19:34

## 2020-08-12 RX ADMIN — FENTANYL CITRATE 25 MCG: 50 INJECTION, SOLUTION INTRAMUSCULAR; INTRAVENOUS at 21:08

## 2020-08-12 RX ADMIN — ROCURONIUM BROMIDE 20 MG: 10 SOLUTION INTRAVENOUS at 19:41

## 2020-08-12 RX ADMIN — KETAMINE HYDROCHLORIDE 30 MG: 10 INJECTION, SOLUTION INTRAMUSCULAR; INTRAVENOUS at 19:45

## 2020-08-12 RX ADMIN — MIDAZOLAM HYDROCHLORIDE 3 MG: 1 INJECTION, SOLUTION INTRAMUSCULAR; INTRAVENOUS at 19:31

## 2020-08-12 RX ADMIN — PROPOFOL 200 MG: 10 INJECTION, EMULSION INTRAVENOUS at 19:38

## 2020-08-12 RX ADMIN — ROCURONIUM BROMIDE 10 MG: 10 SOLUTION INTRAVENOUS at 19:38

## 2020-08-12 RX ADMIN — FENTANYL CITRATE 25 MCG: 50 INJECTION, SOLUTION INTRAMUSCULAR; INTRAVENOUS at 20:52

## 2020-08-12 NOTE — ROUTINE PROCESS
TRANSFER - IN REPORT: 
 
Verbal report received from Paz RN(name) on [facility]  being received from ER(unit) for ordered procedure Report consisted of patients Situation, Background, Assessment and  
Recommendations(SBAR). Information from the following report(s) SBAR was reviewed with the receiving nurse. Opportunity for questions and clarification was provided. Assessment completed upon patients arrival to unit and care assumed.

## 2020-08-12 NOTE — ED NOTES
Patient resting in bed with no signs of distress, denies any needs at this time. Pt reports relief of pain with IV toradol. Will continue to monitor.

## 2020-08-12 NOTE — PROGRESS NOTES
[facility] at [facility] Surgery History and Physical    History of Present Illness:      Barbara Lopez is a 34 y.o. female who has a pilonidal cyst. she has had a cyst present for about 2 years now. She has had 3 infections of the cyst and incision and drainages. Her most recent one was about 3 weeks ago. She is now feeling much better and not having any pain or drainage from the wound. She is now interested in getting the pilonidal cyst excised    No past medical history on file. Past Surgical History:   Procedure Laterality Date    HX HEENT      lasik       No current outpatient medications on file.     No Known Allergies    Social History     Socioeconomic History    Marital status: SINGLE     Spouse name: Not on file    Number of children: Not on file    Years of education: Not on file    Highest education level: Not on file   Occupational History    Not on file   Social Needs    Financial resource strain: Not on file    Food insecurity     Worry: Not on file     Inability: Not on file    Transportation needs     Medical: Not on file     Non-medical: Not on file   Tobacco Use    Smoking status: Current Every Day Smoker    Smokeless tobacco: Never Used   Substance and Sexual Activity    Alcohol use: Not Currently    Drug use: Never    Sexual activity: Not on file   Lifestyle    Physical activity     Days per week: Not on file     Minutes per session: Not on file    Stress: Not on file   Relationships    Social connections     Talks on phone: Not on file     Gets together: Not on file     Attends Roman [employer] service: Not on file     Active member of club or organization: Not on file     Attends meetings of clubs or organizations: Not on file     Relationship status: Not on file    Intimate partner violence     Fear of current or ex partner: Not on file     Emotionally abused: Not on file     Physically abused: Not on file     Forced sexual activity: Not on file Other Topics Concern    Not on file   Social History Narrative    Not on file       No family history on file. ROS   Constitutional: negative  Ears, Nose, Mouth, Throat, and Face: negative  Respiratory: negative  Cardiovascular: negative  Gastrointestinal: negative  Genitourinary:negative  Integument/Breast: Pilonidal cyst in the midline buttock  Hematologic/Lymphatic: negative  Behavioral/Psychiatric: negative  Allergic/Immunologic: negative      Physical Exam:     Visit Vitals  /73 (BP 1 Location: Left arm, BP Patient Position: Sitting)   Pulse 75   Temp 98.5 °F (36.9 °C) (Oral)   Resp 28   Ht 5' 4\" (1.626 m)   Wt 166 lb (75.3 kg)   SpO2 98%   BMI 28.49 kg/m²       General - alert and oriented, no apparent distress  HEENT - no jaundice, no hearing imparement  Pulm - CTAB, no C/W/R  CV - RRR, no M/R/G  Abd -soft, nondistended  Buttock-upper midline buttock with small pits of pilonidal cyst small healed incision on the right buttock just of midline no erythema no drainage  Ext - pulses intact in UE and LE bilaterally, no edema  Skin - supple, no rashes  Psychiatric - normal affect, good mood    Labs  None    Imaging  None  I have reviewed and agree with all of the pertinent images    Assessment:     Barbara Lopez is a 34 y.o. female with pilonidal cyst    Recommendations:     1. She does have a pilonidal cyst and has had 3 infections of the cyst.  The infection has now cleared and there is appears to be no sign of infection currently. We will get her scheduled for pilonidal cystectomy in the OR. We will plan on doing this somewhere in the range of 2 to 4 weeks from now to give it just a little bit more time to heal.  I have discussed the above procedure with the patient in detail. We reviewed the benefits and possible complications of the surgery which include bleeding, infection, damage to adjacent organs, venous thromboembolism, need for repeat surgery, death and other unforseen complications. The patient agreed to proceed with the surgery. Carroll Moseley MD    Ms. Kieran Enriquez has a reminder for a \"due or due soon\" health maintenance. I have asked that she contact her primary care provider for follow-up on this health maintenance.

## 2020-08-12 NOTE — ED NOTES
TRANSFER - OUT REPORT:    Verbal report given to Leandro Holt RN (name) on Barbara Lopez  being transferred to OR (unit) for ordered procedure       Report consisted of patients Situation, Background, Assessment and   Recommendations(SBAR). Information from the following report(s) SBAR, ED Summary, [facility] and Recent Results was reviewed with the receiving nurse. Lines:   Peripheral IV 08/12/20 Left Antecubital (Active)   Site Assessment Clean, dry, & intact 08/12/20 1419   Phlebitis Assessment 0 08/12/20 1419   Infiltration Assessment 0 08/12/20 1419   Dressing Status Clean, dry, & intact 08/12/20 1419   Dressing Type Transparent 08/12/20 1419        Opportunity for questions and clarification was provided.       Patient transported with:   [employer]

## 2020-08-12 NOTE — ANESTHESIA PREPROCEDURE EVALUATION
Relevant Problems   No relevant active problems       Anesthetic History   No history of anesthetic complications            Review of Systems / Medical History  Patient summary reviewed, nursing notes reviewed and pertinent labs reviewed    Pulmonary  Within defined limits                 Neuro/Psych   Within defined limits      Psychiatric history     Cardiovascular  Within defined limits                     GI/Hepatic/Renal  Within defined limits              Endo/Other  Within defined limits           Other Findings              Physical Exam    Airway  Mallampati: II  TM Distance: > 6 cm  Neck ROM: normal range of motion   Mouth opening: Normal     Cardiovascular  Regular rate and rhythm,  S1 and S2 normal,  no murmur, click, rub, or gallop             Dental  No notable dental hx    Comments: Bonding upper/lower incisors recently loose   Pulmonary  Breath sounds clear to auscultation               Abdominal  GI exam deferred       Other Findings            Anesthetic Plan    ASA: 2  Anesthesia type: general          Induction: Intravenous  Anesthetic plan and risks discussed with: Patient

## 2020-08-12 NOTE — H&P
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 White blood cell count mildly elevated electrolytes normal urine pregnancy negative she is 3 weeks into her current menstrual cycle, CT scan abdomen pelvis suggested uncomplicated appendicitis with thickened appendix and periappendiceal stranding. No other urinary or GI symptoms. Of note, she had seen surgeon Dr. Juanita Alvarado earlier this morning regarding pilonidal cyst but was asymptomatic at that time. Patient concurred with recommendations for appendectomy rather than antibiotic therapy and medical nonoperative intervention benefits risks alternatives reviewed  Past Medical History:   Diagnosis Date    Anxiety     Depression      Past Surgical History:   Procedure Laterality Date    HX HEENT      lasik      History reviewed. No pertinent family history. Social History     Tobacco Use    Smoking status: Current Every Day Smoker    Smokeless tobacco: Never Used   Substance Use Topics    Alcohol use: Not Currently      Prior to Admission medications    Not on File      No Known Allergies    Review of Systems   Respiratory: Negative. Gastrointestinal: Positive for abdominal pain and nausea. Negative for blood in stool, rectal pain and vomiting. Genitourinary: Negative. Musculoskeletal: Negative. Neurological: Negative. Hematological: Negative. Psychiatric/Behavioral: Negative. All other systems reviewed and are negative.       Objective:     Patient Vitals for the past 8 hrs:   BP Temp Pulse Resp SpO2   20 1929 122/75  69 16 99 %   20 1816 123/75 98.5 °F (36.9 °C) 75 20 99 %   20 1341 122/87 98.3 °F (36.8 °C) 91 24 100 %       Temp (24hrs), Av.4 °F (36.9 °C), Min:98.3 °F (36.8 °C), Max:98.5 °F (36.9 °C)      Physical Exam  Vitals signs and nursing note reviewed. Exam conducted with a chaperone present. Constitutional:       General: She is not in acute distress. Appearance: Normal appearance. HENT:      Head: Normocephalic. Mouth/Throat:      Pharynx: Oropharynx is clear. Eyes:      Conjunctiva/sclera: Conjunctivae normal.   Cardiovascular:      Rate and Rhythm: Normal rate and regular rhythm. Pulmonary:      Effort: Pulmonary effort is normal.      Breath sounds: Normal breath sounds. Abdominal:       Musculoskeletal: Normal range of motion. Skin:     General: Skin is warm and dry. Coloration: Skin is not jaundiced. Neurological:      General: No focal deficit present. Mental Status: She is alert and oriented to person, place, and time. Psychiatric:         Mood and Affect: Mood normal.         Behavior: Behavior normal.         Thought Content: Thought content normal.         Judgment: Judgment normal.         Assessment:     Active Problems:    Acute appendicitis with localized peritonitis, without perforation, abscess, or gangrene (8/12/2020)        Plan:     Patient concurred with recommendations for appendectomy rather than antibiotic therapy and medical nonoperative intervention benefits risks alternatives reviewed      Discussed the risk of surgery including but not limited to bleeding infection recovery morbidity mortality open surgery postoperative infection abscess and recovery issues and care. ,  and the risks of general anesthetic. The patient understands the risks; any and all questions were answered to the patient's satisfaction.     Signed By: Larry Quezada MD   [id number] [facility]    August 12, 2020

## 2020-08-12 NOTE — ED TRIAGE NOTES
Pt c/o right sided abd pain that started about one hour ago, +nausea, denies any new diarrhea, denies constipation, denies urinary symptoms , unsure of pregnancy , denies vaginal discharge or bleeding

## 2020-08-12 NOTE — TELEPHONE ENCOUNTER
Reason for Disposition   SEVERE abdominal pain (e.g., excruciating)    Answer Assessment - Initial Assessment Questions  1. LOCATION: \"Where does it hurt? \"       Umbilical area to the right. 2. RADIATION: \"Does the pain shoot anywhere else? \" (e.g., chest, back)      Throughout abdomen. 3. ONSET: \"When did the pain begin? \" (e.g., minutes, hours or days ago)    45 mins ago. 4. SUDDEN: \"Gradual or sudden onset? \"      Sudden onset. 5. PATTERN \"Does the pain come and go, or is it constant? \"     - If constant: \"Is it getting better, staying the same, or worsening? \"       (Note: Constant means the pain never goes away completely; most serious pain is constant and it progresses)      - If intermittent: \"How long does it last?\" \"Do you have pain now? \"      (Note: Intermittent means the pain goes away completely between bouts)     Constant. 6. SEVERITY: \"How bad is the pain? \"  (e.g., Scale 1-10; mild, moderate, or severe)    - MILD (1-3): doesn't interfere with normal activities, abdomen soft and not tender to touch     - MODERATE (4-7): interferes with normal activities or awakens from sleep, tender to touch     - SEVERE (8-10): excruciating pain, doubled over, unable to do any normal activities       8/10 severe pain. 7. RECURRENT SYMPTOM: \"Have you ever had this type of abdominal pain before? \" If so, ask: \"When was the last time? \" and \"What happened that time? \"      Denies. 8. CAUSE: \"What do you think is causing the abdominal pain? \"     Unsure. 9. RELIEVING/AGGRAVATING FACTORS: \"What makes it better or worse? \" (e.g., movement, antacids, bowel movement)     Denies. 10. OTHER SYMPTOMS: \"Has there been any vomiting, diarrhea, constipation, or urine problems? \"        Nausea. 11. PREGNANCY: \"Is there any chance you are pregnant? \" \"When was your last menstrual period? \"       Unsure. Protocols used: ABDOMINAL PAIN - FEMALE-ADULT-[place]    Please do not respond to the triage nurse through this encounter. Any subsequent communication should be directly with the patient.

## 2020-08-12 NOTE — ED PROVIDER NOTES
HPI patient is a 70-year-old with past medical history significant for anxiety and depression who presents to the ED with abrupt onset of right lower quadrant abdominal pain and nausea around 12 noon today. The pain has intensified with position changes, walking and palpation. Denies fever, cold symptoms, headache, neck pain, visual changes, focal weakness or rash. Denies any difficulty breathing, difficulty swallowing, SOB or chest pain. Denies any vomiting or diarrhea. Pt. Reports that she last ate around 11 AM this morning and has not had any medications today prior to arrival.  Patient reports that she completed a 10-day course of doxycycline approximately 1 week ago for treatment of a pilonidal cyst.    Past Medical History:   Diagnosis Date    Anxiety     Depression        Past Surgical History:   Procedure Laterality Date    HX HEENT      lasik         History reviewed. No pertinent family history.     Social History     Socioeconomic History    Marital status: SINGLE     Spouse name: Not on file    Number of children: Not on file    Years of education: Not on file    Highest education level: Not on file   Occupational History    Not on file   Social Needs    Financial resource strain: Not on file    Food insecurity     Worry: Not on file     Inability: Not on file    Transportation needs     Medical: Not on file     Non-medical: Not on file   Tobacco Use    Smoking status: Current Every Day Smoker    Smokeless tobacco: Never Used   Substance and Sexual Activity    Alcohol use: Not Currently    Drug use: Never    Sexual activity: Not on file   Lifestyle    Physical activity     Days per week: Not on file     Minutes per session: Not on file    Stress: Not on file   Relationships    Social connections     Talks on phone: Not on file     Gets together: Not on file     Attends Taoist service: Not on file     Active member of club or organization: Not on file     Attends meetings of clubs or organizations: Not on file     Relationship status: Not on file    Intimate partner violence     Fear of current or ex partner: Not on file     Emotionally abused: Not on file     Physically abused: Not on file     Forced sexual activity: Not on file   Other Topics Concern    Not on file   Social History Narrative    Not on file         ALLERGIES: Patient has no known allergies. Review of Systems   Constitutional: Negative for activity change, appetite change, fever and unexpected weight change. HENT: Negative for congestion, rhinorrhea, sore throat and trouble swallowing. Eyes: Negative for visual disturbance. Respiratory: Negative for cough and shortness of breath. Cardiovascular: Negative for chest pain, palpitations and leg swelling. Gastrointestinal: Positive for abdominal pain and nausea. Negative for diarrhea and vomiting. Genitourinary: Negative for dysuria. Musculoskeletal: Negative for arthralgias and myalgias. Skin: Negative for rash. Neurological: Negative for dizziness, light-headedness and headaches. All other systems reviewed and are negative. Vitals:    08/12/20 1341   BP: 122/87   Pulse: 91   Resp: 24   Temp: 98.3 °F (36.8 °C)   SpO2: 100%            Physical Exam  Vitals signs and nursing note reviewed. Constitutional:       General: She is not in acute distress. Appearance: She is well-developed. She is not ill-appearing, toxic-appearing or diaphoretic. 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 Pulmonary:      Effort: Pulmonary effort is normal.      Breath sounds: Normal breath sounds. Abdominal:      General: Bowel sounds are normal. There is no distension. There are no signs of injury. Palpations: Abdomen is soft. There is no mass. Tenderness: There is abdominal tenderness in the right lower quadrant. There is guarding. There is no rebound.    Skin:     General: Skin is warm and dry. Findings: No rash. Neurological:      General: No focal deficit present. Mental Status: She is alert and oriented to person, place, and time. Psychiatric:         Mood and Affect: Mood normal.         Behavior: Behavior normal.          MDM       Procedures      Consult general surgery; Dr. Mely Hager will come to the ED to evaluate the patient; plans to take to the OR tonight. Ct Abd Pelv Wo Cont    Result Date: 8/12/2020  IMPRESSION: Acute uncomplicated appendicitis. 5:59 PM  Patient's results and plan of care have been reviewed with her. Patient has verbally conveyed her understanding and agreement of her signs, symptoms, diagnosis, treatment and prognosis and additionally agrees to be admitted for treatment of acute appendicitis. Debby Elena NP  Discussed plan of care with Dr. Carol Lozano.  Debby Elena NP

## 2020-08-12 NOTE — PROGRESS NOTES
1. Have you been to the ER, urgent care clinic since your last visit? Hospitalized since your last visit? No    2. Have you seen or consulted any other health care providers outside of the [address] [place] [address] since your last visit? Include any pap smears or colon screening.  No

## 2020-08-13 VITALS
TEMPERATURE: 98.3 F | OXYGEN SATURATION: 97 % | RESPIRATION RATE: 16 BRPM | SYSTOLIC BLOOD PRESSURE: 91 MMHG | DIASTOLIC BLOOD PRESSURE: 50 MMHG | HEART RATE: 50 BPM

## 2020-08-13 PROCEDURE — 74011000258 HC RX REV CODE- 258: Performed by: SURGERY

## 2020-08-13 PROCEDURE — 74011250636 HC RX REV CODE- 250/636: Performed by: SURGERY

## 2020-08-13 PROCEDURE — 74011250637 HC RX REV CODE- 250/637: Performed by: SURGERY

## 2020-08-13 PROCEDURE — 99218 HC RM OBSERVATION: CPT

## 2020-08-13 RX ORDER — KETOROLAC TROMETHAMINE 10 MG/1
10 TABLET, FILM COATED ORAL
Qty: 20 TAB | Refills: 0 | Status: SHIPPED | OUTPATIENT
Start: 2020-08-13 | End: 2020-08-18

## 2020-08-13 RX ADMIN — HYDROMORPHONE HYDROCHLORIDE 0.5 MG: 1 INJECTION, SOLUTION INTRAMUSCULAR; INTRAVENOUS; SUBCUTANEOUS at 02:21

## 2020-08-13 RX ADMIN — OXYCODONE HYDROCHLORIDE 5 MG: 5 TABLET ORAL at 07:18

## 2020-08-13 RX ADMIN — HYDROMORPHONE HYDROCHLORIDE 0.5 MG: 1 INJECTION, SOLUTION INTRAMUSCULAR; INTRAVENOUS; SUBCUTANEOUS at 10:37

## 2020-08-13 RX ADMIN — PIPERACILLIN AND TAZOBACTAM 3.38 G: 3; .375 INJECTION, POWDER, LYOPHILIZED, FOR SOLUTION INTRAVENOUS at 07:18

## 2020-08-13 NOTE — PROGRESS NOTES
Problem: Falls - Risk of  Goal: *Absence of Falls  Description: Document Lisa Perea Fall Risk and appropriate interventions in the flowsheet.   Outcome: Resolved/Met  Note: Fall Risk Interventions:            Medication Interventions: Evaluate medications/consider consulting pharmacy, Patient to call before getting OOB, Teach patient to arise slowly    Elimination Interventions: Call light in reach, Patient to call for help with toileting needs, Toileting schedule/hourly rounds              Problem: Patient Education: Go to Patient Education Activity  Goal: Patient/Family Education  Outcome: Resolved/Met

## 2020-08-13 NOTE — PROGRESS NOTES
I have reviewed discharge instructions with the patient. Questions and concerns addressed. Prescriptions faxed to patient's pharmacy. The patient verbalized understanding. IV access removed. Patient will be discharged via private vehicle.

## 2020-08-13 NOTE — DISCHARGE SUMMARY
Physician Discharge Summary     Patient ID:  Idalia Melendez  [id number]  57 y.o.  1990    Admit Date: 8/12/2020    Discharge Date: 8/13/2020    Admission Diagnoses: Appendicitis Shira Yeung; Appendicitis [K37]    Discharge Diagnoses: Active Problems:    Acute appendicitis with localized peritonitis, without perforation, abscess, or gangrene (8/12/2020)      Appendicitis (8/12/2020)         Admission Condition: Fair    Discharge Condition: Good    Last Procedure: Procedure(s):  [address] Course:     ROS patient with normal expected abdominal soreness from surgery but she says her preoperative pain has resolved and she is feeling much better and thirsty and tolerating liquids well, normal urine output    Physical Exam patient alert awake oriented no acute distress abdomen soft minimally normal tenderness incisions clean healing well no evidence of surgical complications or peritoneal signs. Normal hospital course for this procedure. Patient doing well after laparoscopic appendectomy for appendicitis, recovering nicely, patient concurs and ready for discharge. No indication for postop antibiotics at home, care and follow-up showering okay all reviewed with patient, increase activities as tolerated. Consults: None    Disposition: home    Patient Instructions:   Current Discharge Medication List      START taking these medications    Details   oxyCODONE IR (ROXICODONE) 5 mg immediate release tablet Take 1 Tab by mouth every six (6) hours as needed for Pain for up to 4 days. Max Daily Amount: 20 mg.  Qty: 12 Tab, Refills: 0    Associated Diagnoses: Acute appendicitis with localized peritonitis, without perforation, abscess, or gangrene      ondansetron (ZOFRAN ODT) 4 mg disintegrating tablet Take 1 Tab by mouth every eight (8) hours as needed for Nausea or Nausea or Vomiting.  Indications: prevent nausea and vomiting after surgery  Qty: 10 Tab, Refills: 0           Activity: Activity as tolerated  Diet: Regular Diet  Wound Care: None needed    Follow-up with surgery office in 2 weeks.   Follow-up tests/labs none    Signed:  Noemi Dowd MD  [id number] [facility]  8/13/2020  9:15 AM

## 2020-08-13 NOTE — OP NOTES
FULL Operative Note    Patient: Grisel Londono MRN: [id number]  SSN: [id number]    YOB: 1990  Age: 34 y.o. Sex: female      Date of Surgery: 8/12/2020     Preoperative Diagnosis: APPENDICITIS acute    Postoperative Diagnosis: APPENDICITIS acute    Surgeon(s) and Role:     Mariia Montes De Oca MD - Primary    Surgical Staff: Circ-1: Vishal aPlma RN  Scrub Tech-1: Egbert Moritz     Anesthesia: General     Procedure: Procedure(s):  APPENDECTOMY LAPAROSCOPIC    FINDINGS: Early appendicitis with small amount of pelvic cloudy fluid    Indications: The patient was admitted to the hospital with   acute appendicitis  Discussed the risk of surgery including infection, hematoma, bleeding, recurrence or persistence of symptoms or and other risks listed in H and P,  and the risks of general anesthetic including Myocardial Infarction, Cerebrovascular Accident, sudden death, or even reaction to anesthetic medications. The patient understands the risk that the procedure could be an open procedure. The patient understands the risks, any and all questions were answered to the patient's satisfaction, and they freely signed the consent for operation. Procedure in Detail: Patient was under general anesthesia received IV Zosyn preoperatively, abdomen prepped and draped with ChloraPrep and site verification and consent confirmed by the operative team, and during the course of the procedure, 20 mL of Marcaine with epinephrine half percent, were used around the operative sites for perioperative pain control. A 5 mm non-bladed trocar under direct visualization with the 5 mm, 30 degree laparoscope using the 5 mm trocar was placed in the left lateral umbilicus to avoid previous inferior umbilical piercings  . This was done under direct visualization and the abdomen was explored under carbon dioxide insufflation.   Liver stomach and small and large intestine and gallbladder could that could be visualized appeared normal.  A 5 mm non-bladed trocar was placed in the left lower abdomen under direct visualization without difficulty and a 12 mm non-bladed trocar was placed in the right mid abdomen along the medial border of 1 of her tattoo leaves longitudinally  in order to avoid disrupting her extensive tattooing on the right abdomen. The exploration revealed a small amount of cloudy pelvic fluid which was aspirated, and the right and left ovaries and uterus and fallopian tubes appeared normal the sigmoid colon and small intestine that could be visualized appeared normal but the appendix was stiff and edematous enlarged and  consistent with early appendicitis, but no gross perforation or abscess or necrosis. A window was created in the mesoappendix at the appendix cecal junction and a single fire of the laparoscopic CHILO 45 blue cartridge was used to divide and ligate the appendix at the cecum with good hemostasis and no leak. A laparoscopic CHILO white vascular cartridge was used to divide and ligate the mesoappendix with good hemostasis and no bleeding or complications or injury. The pelvis and right abdomen were irrigated with saline and evacuated appendix placed in a retrieval bag and removed from the 12 mm trocar site and examined without mass the base of the appendix grossly and laparoscopically appeared normal the distal two thirds of the appendix was appendicitis. The abdomen was irrigated and saline and laparoscope used to multiple trocar sites to examine the abdomen without injury. 0 Vicryl suture was used to close the 12 mm trocar site under direct visualization and carbon dioxide evacuated and a 3-0 Monocryl suture used to close the subcutaneous spaces and 4-0 Monocryl used to close the subcuticular spaces of skin and dressed with Dermabond and patient awakened and taken to recovery in stable condition.     I called pt sister Cece Hobbs [phone]  Estimated Blood Loss:  <10 ml     Tourniquet Time: * No tourniquets in log *      Implants: * No implants in log *            Specimens:   ID Type Source Tests Collected by Time Destination   1 : appendix Fresh Abdomen  Shantal Goodrich MD 8/12/2020 2004 Pathology           Drains: None                 Complications: None    Counts: Sponge and needle counts were correct times two.     Gabriella England MD

## 2020-08-13 NOTE — ANESTHESIA POSTPROCEDURE EVALUATION
Procedure(s):  APPENDECTOMY LAPAROSCOPIC. general    Anesthesia Post Evaluation      Multimodal analgesia: multimodal analgesia used between 6 hours prior to anesthesia start to PACU discharge  Patient location during evaluation: PACU  Patient participation: complete - patient participated  Level of consciousness: awake  Pain score: 2  Pain management: adequate  Airway patency: patent  Anesthetic complications: no  Cardiovascular status: acceptable  Respiratory status: acceptable  Hydration status: acceptable  Comments: I have evaluated the patient and meets criteria for discharge from PACU. Joseph Pruitt MD  Post anesthesia nausea and vomiting:  controlled      INITIAL Post-op Vital signs:   Vitals Value Taken Time   /72 8/12/2020  9:15 PM   Temp 36.5 °C (97.7 °F) 8/12/2020  8:36 PM   Pulse 63 8/12/2020  9:25 PM   Resp 14 8/12/2020  9:25 PM   SpO2 99 % 8/12/2020  9:25 PM   Vitals shown include unvalidated device data.

## 2020-08-13 NOTE — PROGRESS NOTES
TRANSFER - IN REPORT:    Verbal report received from [facility]) on [facility]  being received from [facility]) for routine post - op      Report consisted of patients Situation, Background, Assessment and   Recommendations(SBAR). Information from the following report(s) SBAR, Kardex, OR Summary, Procedure Summary, Intake/Output, MAR and Recent Results was reviewed with the receiving nurse. Opportunity for questions and clarification was provided. Assessment completed upon patients arrival to unit and care assumed.

## 2020-08-13 NOTE — PERIOP NOTES
TRANSFER - OUT REPORT:    Verbal report given to Alexa Rn(name) on [facility]  being transferred to 637(unit) for routine post - op       Report consisted of patients Situation, Background, Assessment and   Recommendations(SBAR). Time Pre op antibiotic given:1820  Anesthesia Stop time: 2130  Blake Present on Transfer to floor:no  Order for Blake on Chart:no  Discharge Prescriptions with Chart:no    Information from the following report(s) SBAR, Kardex, OR Summary, Recent Results, Cardiac Rhythm NSR and Alarm Parameters  was reviewed with the receiving nurse. Opportunity for questions and clarification was provided. Is the patient on 02? NO       L/Min 0       Other 0    Is the patient on a monitor? NO    Is the nurse transporting with the patient? YES    Surgical Waiting Area notified of patient's transfer from PACU? YES      The following personal items collected during your admission accompanied patient upon transfer:   Dental Appliance: Dental Appliances: Retainer(s)(bonded)  Vision: Visual Aid: None  Hearing Aid:    Jewelry: Jewelry: (nipple ring removed, in purse)  Clothing: Clothing: (belongings to [employer])  Other Valuables:  Other Valuables: Cell Phone, Purse(sent to pacu)  Valuables sent to safe:

## 2020-08-14 ENCOUNTER — PATIENT OUTREACH (OUTPATIENT)
Dept: OTHER | Age: 30
End: 2020-08-14

## 2020-08-14 NOTE — PROGRESS NOTES
I called your cell and left VM,  Pathology with appendicitis, nothing bad, I trust you are doing well

## 2020-08-14 NOTE — PROGRESS NOTES
Patient on report as eligible for Case Management. Left discreet message on voicemail with this CM contact information. Will attempt to contact again to offer [address] [place] [address] Management services. Admitted on 8/12 to [facility] for appendectomy. Will reach out again on 8/17.

## 2020-08-17 ENCOUNTER — PATIENT OUTREACH (OUTPATIENT)
Dept: OTHER | Age: 30
End: 2020-08-17

## 2020-08-17 NOTE — LETTER
8/17/2020 12:36 PM 
 
Ms. Idalia Melendez 
[address] [facility] [phone] Dear Zora Perez, My name is Mauricio Acevedo, Associate Care Manager for [employer] and I have been trying to reach you. The Associate Care Management (ACM) program is a free-of-charge confidential service provided to our associates and their family members covered by the [facility]. The program will provide an associate and his/her family with the [facility] expertise to assist in navigating the health care delivery system, provider services, and their overall care needsso as to assure and improve health care interactions and enhance the quality of life. This program is designed to provide you with the opportunity to have a [employer] [facility] partner with you for the following services: 
 
 1) when you come home from the hospital or emergency room 2) when help is needed to manage your disease 3) when you need assistance coordinating services or appointments [facility] is dedicated to empowering the good health of its community and improving the quality of care and care experiences for associates and their families. We are committed to safeguarding patient confidentiality and privacy, assuring that every associate has the respect he or she deserves in managing their health. The information shared with your care manager will not be shared with anyone else aside from those you identify as part of your care team, and will only be used to assist you with any identified care needs. Please contact me if you would like this service provided to you. Sincerely, CHRISTINE SpearN  Associate Care Manager [address] [place] [address] [address] [place] [address]  [phone]  [phone]  F [phone]  [email or web address] [email or web address] email: [email or web address]

## 2020-08-17 NOTE — PROGRESS NOTES
Patient identified as eligible for [address] [place] [address] services. Second telephone outreach attempted. Left discreet voicemail with this CM confidential contact information. Will send UTR letter.

## 2020-08-18 ENCOUNTER — TELEPHONE (OUTPATIENT)
Dept: SURGERY | Age: 30
End: 2020-08-18

## 2020-08-18 NOTE — TELEPHONE ENCOUNTER
Thuy Cortez thiago patient of Dr. Kaylee Donaldson who had a lap appe by Dr. Darshan Gross at [facility] on 8/l2/2020 and was told that she could be seen here for her follow-up appointment. She has some questions and would like for you to call her.

## 2020-08-18 NOTE — TELEPHONE ENCOUNTER
Spoke with patient advised to use ice packs on abdomen to help with discomfort. Also advised not to sleep on right side until it has healed.

## 2020-08-21 ENCOUNTER — TELEPHONE (OUTPATIENT)
Dept: SURGERY | Age: 30
End: 2020-08-21

## 2020-08-21 NOTE — TELEPHONE ENCOUNTER
Ms. Alexandra Esters had recent surgery by Dr. Mely Hager and was to follow-up with Dr. Doug Mcelroy. She called earlier this week and spoke w/Dr. Shea Escobar nurse. She is still having a lot of pain and would like for a nurse to call her.

## 2020-08-21 NOTE — TELEPHONE ENCOUNTER
Patient identified with two patient identifiers. Patient 9 days post lap appendectomy with C/O increased abdominal pain that started today about 11 am this morning. Patient states she was feeling much better the last 2 days just a little soreness like she had done a lot of crunches. She has mild nausea. No C/O vomiting, abdominal bloating, swelling, fever, incisional drainage, or issues with bowel and bladder. Patient states she was managing fine taking tylenol and ibuprofen now she is back to the max amount of her prescription pain medication. Patient states she is very anxious.   Patient placed on hold to follow up with NP.

## 2020-08-21 NOTE — TELEPHONE ENCOUNTER
Spoke with Guero Jorgensen. Was feeling ok and cutting back on Tylenol and NSAIDS. Was able to do more activity and today has been having more pain RLQ. Ice helps some, but concerned something is wrong. She is tearful and \"I have anxiety\". Says she took an ativan and it has helped some. No associated vomiting, fevers or chills.   Little nausea today   No dysuria   Had period after surgery and was ok   BM today and \"ok\"   Walking and doing light stuff around the house   Advised to take 800 mg Ibuprofen q 8 hours with food and can use 1 gm Tylenol every 8 hours alternating   Try heating pad and take it easy   Stay hydrated   If pain worsens and or fevers, vomiting she needs to come to the ER and she agreed  Has appt Monday

## 2020-08-24 ENCOUNTER — OFFICE VISIT (OUTPATIENT)
Dept: SURGERY | Age: 30
End: 2020-08-24
Payer: COMMERCIAL

## 2020-08-24 VITALS
BODY MASS INDEX: 27.83 KG/M2 | TEMPERATURE: 98.4 F | DIASTOLIC BLOOD PRESSURE: 79 MMHG | RESPIRATION RATE: 17 BRPM | HEART RATE: 87 BPM | HEIGHT: 64 IN | WEIGHT: 163 LBS | OXYGEN SATURATION: 97 % | SYSTOLIC BLOOD PRESSURE: 122 MMHG

## 2020-08-24 DIAGNOSIS — K35.30 ACUTE APPENDICITIS WITH LOCALIZED PERITONITIS, WITHOUT PERFORATION, ABSCESS, OR GANGRENE: ICD-10-CM

## 2020-08-24 DIAGNOSIS — Z09 POSTOPERATIVE EXAMINATION: Primary | ICD-10-CM

## 2020-08-24 PROCEDURE — 99024 POSTOP FOLLOW-UP VISIT: CPT | Performed by: NURSE PRACTITIONER

## 2020-08-24 NOTE — LETTER
NOTIFICATION OF RETURN TO WORK 
 
8/24/2020 1:44 PM 
 
Ms. Therese Lara 
[address] [address] [phone] [facility] To Whom It May Concern: 
 
Therese Lara was under the care of Parish Wiggins from August 12, 2020 to present. She will be able to return to work on September 7, 2020 with no restrictions. If there are questions or concerns please have the patient contact our office. Sincerely, Javid Villagran NP

## 2020-08-24 NOTE — PROGRESS NOTES
Subjective:      Gabino Tay is a 34 y.o. female presents for postop care 12 days following laparoscopic appendectomy by Dr. Shima Mackey. Appetite is good. Eating a regular diet without difficulty. Bowel movements are  regular. Pain is controlled without any medications. Pt feels a lot better than when she called in last week crying. Denies fever, nausea, shortness of breath, chest pain, redness at incision site, vomiting and diarrhea    Pathology:  Acute appendicitis without mention of peritonitis    Objective:     Visit Vitals  /79 (BP 1 Location: Left arm, BP Patient Position: Sitting)   Pulse 87   Temp 98.4 °F (36.9 °C) (Oral)   Resp 17   Ht 5' 4\" (1.626 m)   Wt 163 lb (73.9 kg)   SpO2 97%   BMI 27.98 kg/m²       General:  alert, no distress   Abdomen: soft, bowel sounds active, mildly TTP at RLQ   Incision:   healing well, no drainage, no erythema, no seroma, no swelling, no dehiscence, incisions well approximated   Heart: regular rate and rhythm, S1, S2 normal, no murmur, click, rub or gallop   Lungs: clear to auscultation bilaterally     Assessment:     1. Appendicitis, unqualified. Doing well postoperatively. Plan:     1. Pt is to increase activities as tolerated, but no heavy lift for another week and a half. RTW note for 9/7/20.  2. Follow-up: prn    Ms. Annmarie Mccormack has a reminder for a \"due or due soon\" health maintenance. I have asked that she contact her primary care provider for follow-up on this health maintenance. Patient verbalized understanding and agreement.

## 2020-08-24 NOTE — PATIENT INSTRUCTIONS
Appendectomy: What to Expect at [facility] Your Recovery Your doctor removed your appendix either by making many small cuts, called incisions, in your belly (laparoscopic surgery) or through open surgery. In open surgery, the doctor makes one large incision. The incisions leave scars that usually fade over time. After your surgery, it is normal to feel weak and tired for several days after you return home. Your belly may be swollen and may be painful. If you had laparoscopic surgery, you may have pain in your shoulder for about 24 hours. You may also feel sick to your stomach and have diarrhea, constipation, gas, or a headache. This usually goes away in a few days. Your recovery time depends on the type of surgery you had. If you had laparoscopic surgery, you will probably be able to return to work or a normal routine 1 to 3 weeks after surgery. If you had an open surgery, it may take 2 to 4 weeks. If your appendix ruptured, you may have a drain in your incision. Your body will work fine without an appendix. You will not have to make any changes in your diet or lifestyle. This care sheet gives you a general idea about how long it will take for you to recover. But each person recovers at a different pace. Follow the steps below to get better as quickly as possible. How can you care for yourself at home? Activity · Rest when you feel tired. Getting enough sleep will help you recover. · Try to walk each day. Start by walking a little more than you did the day before. Bit by bit, increase the amount you walk. Walking boosts blood flow and helps prevent pneumonia and constipation. · For about 2 weeks, avoid lifting anything that would make you strain. This may include a child, heavy grocery bags and milk containers, a heavy briefcase or backpack, cat litter or dog food bags, or a vacuum .  
· Avoid strenuous activities, such as bicycle riding, jogging, weight lifting, or aerobic exercise, until your doctor says it is okay. · You may be able to take showers (unless you have a drain near your incision) 24 to 48 hours after surgery. Pat the incision dry. Do not take a bath for the first 2 weeks, or until your doctor tells you it is okay. If you have a drain near your incision, follow your doctor's instructions. · You may drive when you are no longer taking pain medicine and can quickly move your foot from the gas pedal to the brake. You must also be able to sit comfortably for a long period of time, even if you do not plan on going far. You might get caught in traffic. · You will probably be able to go back to work in 1 to 3 weeks. If you had an open surgery, it may take 3 to 4 weeks. · Your doctor will tell you when you can have sex again. Diet · You can eat your normal diet. If your stomach is upset, try bland, low-fat foods like plain rice, broiled chicken, toast, and yogurt. · Drink plenty of fluids (unless your doctor tells you not to). · You may notice that your bowel movements are not regular right after your surgery. This is common. Try to avoid constipation and straining with bowel movements. You may want to take a fiber supplement every day. If you have not had a bowel movement after a couple of days, ask your doctor about taking a mild laxative. Medicines · Your doctor will tell you if and when you can restart your medicines. He or she will also give you instructions about taking any new medicines. · If you take aspirin or some other blood thinner, ask your doctor if and when to start taking it again. Make sure that you understand exactly what your doctor wants you to do. · If your appendix ruptured, you will need to take antibiotics. Take them as directed. Do not stop taking them just because you feel better. You need to take the full course of antibiotics. · Be safe with medicines. Take pain medicines exactly as directed. ? If the doctor gave you a prescription medicine for pain, take it as prescribed. ? If you are not taking a prescription pain medicine, take an over-the-counter medicine such as acetaminophen (Tylenol), ibuprofen (Advil, Motrin), or naproxen (Aleve). Read and follow all instructions on the label. ? Do not take two or more pain medicines at the same time unless the doctor told you to. Many pain medicines have acetaminophen, which is Tylenol. Too much Tylenol can be harmful. · If you think your pain medicine is making you sick to your stomach: 
? Take your medicine after meals (unless your doctor has told you not to). ? Ask your doctor for a different pain medicine. Incision care · If you had an open surgery, you may have staples in your incision. The doctor will take these out in 7 to 10 days. · If you have strips of tape on the incision, leave the tape on for a week or until it falls off. · You may wash the area with warm, soapy water 24 to 48 hours after your surgery, unless your doctor tells you not to. Pat the area dry. · Keep the area clean and dry. You may cover it with a gauze bandage if it weeps or rubs against clothing. Change the bandage every day. · If your appendix ruptured, you may have an incision with packing in it. Change the packing as often as your doctor tells you to. ? Packing changes may hurt at first. Taking pain medicine about half an hour before you change the dressing can help. ? If your dressing sticks to your wound, try soaking it with warm water for about 10 minutes before you remove it. You can do this in the shower or by placing a wet washcloth over the dressing. ? Remove the old packing and flush the incision with water. Gently pat the top area dry. ? The size of the incision determines how much gauze you need to put inside. Fold the gauze over once, but do not wad it up so that it hurts. Put it in the wound carefully.  You want to keep the sides of the wound from touching. A cotton swab may help you push the gauze in as needed. ? Put a gauze pad over the wound, and tape it down. ? You may notice greenish gray fluid seeping from your wound as you start to heal. This is normal. It is a sign that your wound is healing. Follow-up care is a key part of your treatment and safety. Be sure to make and go to all appointments, and call your doctor if you are having problems. It's also a good idea to know your test results and keep a list of the medicines you take. When should you call for help? [id number] anytime you think you may need emergency care. For example, call if: 
· You passed out (lost consciousness). · You are short of breath. Amy Restrepo Call your doctor now or seek immediate medical care if: 
· You are sick to your stomach and cannot drink fluids. · You cannot pass stools or gas. · You have pain that does not get better when you take your pain medicine. · You have signs of infection, such as: 
? Increased pain, swelling, warmth, or redness. ? Red streaks leading from the wound. ? Pus draining from the wound. ? A fever. · You have loose stitches, or your incision comes open. · Bright red blood has soaked through the bandage over your incision. · You have signs of a blood clot in your leg (called a deep vein thrombosis), such as: 
? Pain in your calf, back of knee, thigh, or groin. ? Redness and swelling in your leg or groin. Watch closely for any changes in your health, and be sure to contact your doctor if you have any problems. Where can you learn more? Go to http://www.gray.com/ Enter Z941 in the search box to learn more about \"Appendectomy: What to Expect at Home. \" Current as of: August 12, 2019               Content Version: 12.5 © 2449-7518 [employer]. Care instructions adapted under license by [employer] (which disclaims liability or warranty for this information).  If you have questions about a medical condition or this instruction, always ask your healthcare professional. [place] [address] any warranty or liability for your use of this information.

## 2020-08-25 ENCOUNTER — PATIENT OUTREACH (OUTPATIENT)
Dept: OTHER | Age: 30
End: 2020-08-25

## 2020-08-25 NOTE — PROGRESS NOTES
Verified  and address for HIPAA security. Introduced [employer] for patient. Patient does not identify any Care Management needs at this time and declines services. Patient states she had her follow up yesterday. No need for review of d/c instructions, red flags or meds at this time. Is open to receiving a call in the future.

## 2020-09-24 ENCOUNTER — EMPLOYEE WELLNESS (OUTPATIENT)
Dept: FAMILY MEDICINE CLINIC | Age: 30
End: 2020-09-24

## 2020-09-24 LAB
CHOLEST SERPL-MCNC: 190 MG/DL
GLUCOSE SERPL-MCNC: 89 MG/DL (ref 65–100)
HDLC SERPL-MCNC: 64 MG/DL
LDLC SERPL CALC-MCNC: 115.6 MG/DL (ref 0–100)
TRIGL SERPL-MCNC: 52 MG/DL (ref ?–150)

## 2020-10-08 ENCOUNTER — PATIENT MESSAGE (OUTPATIENT)
Dept: SURGERY | Age: 30
End: 2020-10-08

## 2020-12-04 ENCOUNTER — TRANSCRIBE ORDER (OUTPATIENT)
Dept: REGISTRATION | Age: 30
End: 2020-12-04

## 2020-12-04 ENCOUNTER — HOSPITAL ENCOUNTER (OUTPATIENT)
Dept: PREADMISSION TESTING | Age: 30
Discharge: HOME | End: 2020-12-04
Payer: COMMERCIAL

## 2020-12-04 DIAGNOSIS — Z01.812 PRE-PROCEDURE LAB EXAM: Primary | ICD-10-CM

## 2020-12-04 DIAGNOSIS — Z01.812 PRE-PROCEDURE LAB EXAM: ICD-10-CM

## 2020-12-04 PROCEDURE — 87635 SARS-COV-2 COVID-19 AMP PRB: CPT

## 2020-12-05 LAB — SARS-COV-2, COV2NT: NOT DETECTED

## 2020-12-07 ENCOUNTER — ANESTHESIA EVENT (OUTPATIENT)
Dept: SURGERY | Age: 30
End: 2020-12-07
Payer: COMMERCIAL

## 2020-12-07 NOTE — ANESTHESIA PREPROCEDURE EVALUATION
Relevant Problems   No relevant active problems       Anesthetic History   No history of anesthetic complications            Review of Systems / Medical History  Patient summary reviewed, nursing notes reviewed and pertinent labs reviewed    Pulmonary          Smoker         Neuro/Psych         Psychiatric history     Cardiovascular  Within defined limits                     GI/Hepatic/Renal  Within defined limits              Endo/Other  Within defined limits           Other Findings            Physical Exam    Airway  Mallampati: II  TM Distance: > 6 cm  Neck ROM: normal range of motion   Mouth opening: Normal     Cardiovascular  Regular rate and rhythm,  S1 and S2 normal,  no murmur, click, rub, or gallop             Dental  No notable dental hx       Pulmonary  Breath sounds clear to auscultation               Abdominal  GI exam deferred       Other Findings            Anesthetic Plan    ASA: 2  Anesthesia type: general          Induction: Intravenous  Anesthetic plan and risks discussed with: Patient

## 2020-12-08 ENCOUNTER — HOSPITAL ENCOUNTER (OUTPATIENT)
Age: 30
Setting detail: OUTPATIENT SURGERY
Discharge: HOME | End: 2020-12-08
Attending: SURGERY | Admitting: SURGERY
Payer: COMMERCIAL

## 2020-12-08 ENCOUNTER — ANESTHESIA (OUTPATIENT)
Dept: SURGERY | Age: 30
End: 2020-12-08
Payer: COMMERCIAL

## 2020-12-08 VITALS
OXYGEN SATURATION: 98 % | HEART RATE: 81 BPM | RESPIRATION RATE: 14 BRPM | BODY MASS INDEX: 27.81 KG/M2 | TEMPERATURE: 97.6 F | DIASTOLIC BLOOD PRESSURE: 80 MMHG | HEIGHT: 64 IN | SYSTOLIC BLOOD PRESSURE: 119 MMHG | WEIGHT: 162.92 LBS

## 2020-12-08 DIAGNOSIS — L05.91 PILONIDAL CYST: Primary | ICD-10-CM

## 2020-12-08 LAB — HCG UR QL: NEGATIVE

## 2020-12-08 PROCEDURE — 77030040922 HC BLNKT HYPOTHRM STRY -A

## 2020-12-08 PROCEDURE — 76060000033 HC ANESTHESIA 1 TO 1.5 HR: Performed by: SURGERY

## 2020-12-08 PROCEDURE — 2709999900 HC NON-CHARGEABLE SUPPLY: Performed by: SURGERY

## 2020-12-08 PROCEDURE — 74011250637 HC RX REV CODE- 250/637: Performed by: ANESTHESIOLOGY

## 2020-12-08 PROCEDURE — 74011000250 HC RX REV CODE- 250: Performed by: NURSE ANESTHETIST, CERTIFIED REGISTERED

## 2020-12-08 PROCEDURE — 77030013079 HC BLNKT BAIR HGGR 3M -A: Performed by: ANESTHESIOLOGY

## 2020-12-08 PROCEDURE — 11771 EXC PILONIDAL CYST XTNSV: CPT | Performed by: SURGERY

## 2020-12-08 PROCEDURE — 74011250636 HC RX REV CODE- 250/636: Performed by: NURSE ANESTHETIST, CERTIFIED REGISTERED

## 2020-12-08 PROCEDURE — 77030008684 HC TU ET CUF COVD -B: Performed by: ANESTHESIOLOGY

## 2020-12-08 PROCEDURE — 76210000006 HC OR PH I REC 0.5 TO 1 HR: Performed by: SURGERY

## 2020-12-08 PROCEDURE — 77030042556 HC PNCL CAUT -B: Performed by: SURGERY

## 2020-12-08 PROCEDURE — 88304 TISSUE EXAM BY PATHOLOGIST: CPT

## 2020-12-08 PROCEDURE — 74011000250 HC RX REV CODE- 250: Performed by: SURGERY

## 2020-12-08 PROCEDURE — 76210000020 HC REC RM PH II FIRST 0.5 HR: Performed by: SURGERY

## 2020-12-08 PROCEDURE — 74011250636 HC RX REV CODE- 250/636: Performed by: ANESTHESIOLOGY

## 2020-12-08 PROCEDURE — 74011250637 HC RX REV CODE- 250/637: Performed by: NURSE ANESTHETIST, CERTIFIED REGISTERED

## 2020-12-08 PROCEDURE — 77030031139 HC SUT VCRL2 J&J -A: Performed by: SURGERY

## 2020-12-08 PROCEDURE — 74011250636 HC RX REV CODE- 250/636: Performed by: SURGERY

## 2020-12-08 PROCEDURE — 77030002916 HC SUT ETHLN J&J -A: Performed by: SURGERY

## 2020-12-08 PROCEDURE — 76010000149 HC OR TIME 1 TO 1.5 HR: Performed by: SURGERY

## 2020-12-08 PROCEDURE — 77030040361 HC SLV COMPR DVT MDII -B: Performed by: SURGERY

## 2020-12-08 PROCEDURE — 77030026438 HC STYL ET INTUB CARD -A: Performed by: ANESTHESIOLOGY

## 2020-12-08 PROCEDURE — 81025 URINE PREGNANCY TEST: CPT

## 2020-12-08 RX ORDER — SUCCINYLCHOLINE CHLORIDE 20 MG/ML
INJECTION INTRAMUSCULAR; INTRAVENOUS AS NEEDED
Status: DISCONTINUED | OUTPATIENT
Start: 2020-12-08 | End: 2020-12-08 | Stop reason: HOSPADM

## 2020-12-08 RX ORDER — KETOROLAC TROMETHAMINE 30 MG/ML
INJECTION, SOLUTION INTRAMUSCULAR; INTRAVENOUS AS NEEDED
Status: DISCONTINUED | OUTPATIENT
Start: 2020-12-08 | End: 2020-12-08 | Stop reason: HOSPADM

## 2020-12-08 RX ORDER — SODIUM CHLORIDE 9 MG/ML
25 INJECTION, SOLUTION INTRAVENOUS CONTINUOUS
Status: DISCONTINUED | OUTPATIENT
Start: 2020-12-08 | End: 2020-12-08 | Stop reason: HOSPADM

## 2020-12-08 RX ORDER — DEXAMETHASONE SODIUM PHOSPHATE 4 MG/ML
INJECTION, SOLUTION INTRA-ARTICULAR; INTRALESIONAL; INTRAMUSCULAR; INTRAVENOUS; SOFT TISSUE AS NEEDED
Status: DISCONTINUED | OUTPATIENT
Start: 2020-12-08 | End: 2020-12-08 | Stop reason: HOSPADM

## 2020-12-08 RX ORDER — EPHEDRINE SULFATE/0.9% NACL/PF 50 MG/5 ML
5 SYRINGE (ML) INTRAVENOUS AS NEEDED
Status: DISCONTINUED | OUTPATIENT
Start: 2020-12-08 | End: 2020-12-08 | Stop reason: HOSPADM

## 2020-12-08 RX ORDER — OXYCODONE HYDROCHLORIDE 5 MG/1
5 TABLET ORAL AS NEEDED
Status: DISCONTINUED | OUTPATIENT
Start: 2020-12-08 | End: 2020-12-08 | Stop reason: HOSPADM

## 2020-12-08 RX ORDER — ACETAMINOPHEN 325 MG/1
650 TABLET ORAL ONCE
Status: COMPLETED | OUTPATIENT
Start: 2020-12-08 | End: 2020-12-08

## 2020-12-08 RX ORDER — DIPHENHYDRAMINE HYDROCHLORIDE 50 MG/ML
12.5 INJECTION, SOLUTION INTRAMUSCULAR; INTRAVENOUS AS NEEDED
Status: DISCONTINUED | OUTPATIENT
Start: 2020-12-08 | End: 2020-12-08 | Stop reason: HOSPADM

## 2020-12-08 RX ORDER — PROPOFOL 10 MG/ML
INJECTION, EMULSION INTRAVENOUS AS NEEDED
Status: DISCONTINUED | OUTPATIENT
Start: 2020-12-08 | End: 2020-12-08 | Stop reason: HOSPADM

## 2020-12-08 RX ORDER — ROPIVACAINE HYDROCHLORIDE 5 MG/ML
150 INJECTION, SOLUTION EPIDURAL; INFILTRATION; PERINEURAL AS NEEDED
Status: DISCONTINUED | OUTPATIENT
Start: 2020-12-08 | End: 2020-12-08 | Stop reason: HOSPADM

## 2020-12-08 RX ORDER — SODIUM CHLORIDE, SODIUM LACTATE, POTASSIUM CHLORIDE, CALCIUM CHLORIDE 600; 310; 30; 20 MG/100ML; MG/100ML; MG/100ML; MG/100ML
1000 INJECTION, SOLUTION INTRAVENOUS CONTINUOUS
Status: DISCONTINUED | OUTPATIENT
Start: 2020-12-08 | End: 2020-12-08 | Stop reason: HOSPADM

## 2020-12-08 RX ORDER — FENTANYL CITRATE 50 UG/ML
INJECTION, SOLUTION INTRAMUSCULAR; INTRAVENOUS AS NEEDED
Status: DISCONTINUED | OUTPATIENT
Start: 2020-12-08 | End: 2020-12-08 | Stop reason: HOSPADM

## 2020-12-08 RX ORDER — MIDAZOLAM HYDROCHLORIDE 1 MG/ML
INJECTION, SOLUTION INTRAMUSCULAR; INTRAVENOUS AS NEEDED
Status: DISCONTINUED | OUTPATIENT
Start: 2020-12-08 | End: 2020-12-08 | Stop reason: HOSPADM

## 2020-12-08 RX ORDER — LIDOCAINE HYDROCHLORIDE 10 MG/ML
0.1 INJECTION, SOLUTION EPIDURAL; INFILTRATION; INTRACAUDAL; PERINEURAL AS NEEDED
Status: DISCONTINUED | OUTPATIENT
Start: 2020-12-08 | End: 2020-12-08 | Stop reason: HOSPADM

## 2020-12-08 RX ORDER — MIDAZOLAM HYDROCHLORIDE 1 MG/ML
1 INJECTION, SOLUTION INTRAMUSCULAR; INTRAVENOUS AS NEEDED
Status: DISCONTINUED | OUTPATIENT
Start: 2020-12-08 | End: 2020-12-08 | Stop reason: HOSPADM

## 2020-12-08 RX ORDER — SCOLOPAMINE TRANSDERMAL SYSTEM 1 MG/1
PATCH, EXTENDED RELEASE TRANSDERMAL AS NEEDED
Status: DISCONTINUED | OUTPATIENT
Start: 2020-12-08 | End: 2020-12-08 | Stop reason: HOSPADM

## 2020-12-08 RX ORDER — SODIUM CHLORIDE, SODIUM LACTATE, POTASSIUM CHLORIDE, CALCIUM CHLORIDE 600; 310; 30; 20 MG/100ML; MG/100ML; MG/100ML; MG/100ML
100 INJECTION, SOLUTION INTRAVENOUS CONTINUOUS
Status: DISCONTINUED | OUTPATIENT
Start: 2020-12-08 | End: 2020-12-08 | Stop reason: HOSPADM

## 2020-12-08 RX ORDER — BUPIVACAINE HYDROCHLORIDE AND EPINEPHRINE 5; 5 MG/ML; UG/ML
INJECTION, SOLUTION EPIDURAL; INTRACAUDAL; PERINEURAL AS NEEDED
Status: DISCONTINUED | OUTPATIENT
Start: 2020-12-08 | End: 2020-12-08 | Stop reason: HOSPADM

## 2020-12-08 RX ORDER — ONDANSETRON 2 MG/ML
4 INJECTION INTRAMUSCULAR; INTRAVENOUS AS NEEDED
Status: DISCONTINUED | OUTPATIENT
Start: 2020-12-08 | End: 2020-12-08 | Stop reason: HOSPADM

## 2020-12-08 RX ORDER — MIDAZOLAM HYDROCHLORIDE 1 MG/ML
0.5 INJECTION, SOLUTION INTRAMUSCULAR; INTRAVENOUS
Status: DISCONTINUED | OUTPATIENT
Start: 2020-12-08 | End: 2020-12-08 | Stop reason: HOSPADM

## 2020-12-08 RX ORDER — LIDOCAINE HYDROCHLORIDE 20 MG/ML
INJECTION, SOLUTION EPIDURAL; INFILTRATION; INTRACAUDAL; PERINEURAL AS NEEDED
Status: DISCONTINUED | OUTPATIENT
Start: 2020-12-08 | End: 2020-12-08 | Stop reason: HOSPADM

## 2020-12-08 RX ORDER — MORPHINE SULFATE 10 MG/ML
2 INJECTION, SOLUTION INTRAMUSCULAR; INTRAVENOUS
Status: DISCONTINUED | OUTPATIENT
Start: 2020-12-08 | End: 2020-12-08 | Stop reason: HOSPADM

## 2020-12-08 RX ORDER — ROCURONIUM BROMIDE 10 MG/ML
INJECTION, SOLUTION INTRAVENOUS AS NEEDED
Status: DISCONTINUED | OUTPATIENT
Start: 2020-12-08 | End: 2020-12-08 | Stop reason: HOSPADM

## 2020-12-08 RX ORDER — OXYCODONE AND ACETAMINOPHEN 5; 325 MG/1; MG/1
1 TABLET ORAL
Qty: 30 TAB | Refills: 0 | Status: SHIPPED | OUTPATIENT
Start: 2020-12-08 | End: 2020-12-15

## 2020-12-08 RX ORDER — FENTANYL CITRATE 50 UG/ML
50 INJECTION, SOLUTION INTRAMUSCULAR; INTRAVENOUS AS NEEDED
Status: DISCONTINUED | OUTPATIENT
Start: 2020-12-08 | End: 2020-12-08 | Stop reason: HOSPADM

## 2020-12-08 RX ORDER — ONDANSETRON 2 MG/ML
INJECTION INTRAMUSCULAR; INTRAVENOUS AS NEEDED
Status: DISCONTINUED | OUTPATIENT
Start: 2020-12-08 | End: 2020-12-08 | Stop reason: HOSPADM

## 2020-12-08 RX ORDER — HYDROMORPHONE HYDROCHLORIDE 1 MG/ML
0.2 INJECTION, SOLUTION INTRAMUSCULAR; INTRAVENOUS; SUBCUTANEOUS
Status: ACTIVE | OUTPATIENT
Start: 2020-12-08 | End: 2020-12-08

## 2020-12-08 RX ORDER — FENTANYL CITRATE 50 UG/ML
25 INJECTION, SOLUTION INTRAMUSCULAR; INTRAVENOUS
Status: DISCONTINUED | OUTPATIENT
Start: 2020-12-08 | End: 2020-12-08 | Stop reason: HOSPADM

## 2020-12-08 RX ADMIN — WATER 2 G: 1 INJECTION INTRAMUSCULAR; INTRAVENOUS; SUBCUTANEOUS at 07:40

## 2020-12-08 RX ADMIN — MEPERIDINE HYDROCHLORIDE 25 MG: 50 INJECTION INTRAMUSCULAR; INTRAVENOUS; SUBCUTANEOUS at 08:51

## 2020-12-08 RX ADMIN — ONDANSETRON 4 MG: 2 INJECTION INTRAMUSCULAR; INTRAVENOUS at 09:11

## 2020-12-08 RX ADMIN — FENTANYL CITRATE 50 MCG: 50 INJECTION, SOLUTION INTRAMUSCULAR; INTRAVENOUS at 08:03

## 2020-12-08 RX ADMIN — ONDANSETRON HYDROCHLORIDE 4 MG: 2 INJECTION, SOLUTION INTRAMUSCULAR; INTRAVENOUS at 07:56

## 2020-12-08 RX ADMIN — FENTANYL CITRATE 50 MCG: 50 INJECTION, SOLUTION INTRAMUSCULAR; INTRAVENOUS at 07:44

## 2020-12-08 RX ADMIN — SUGAMMADEX 150 MG: 100 INJECTION, SOLUTION INTRAVENOUS at 08:38

## 2020-12-08 RX ADMIN — PROPOFOL 200 MG: 10 INJECTION, EMULSION INTRAVENOUS at 07:44

## 2020-12-08 RX ADMIN — SUCCINYLCHOLINE CHLORIDE 120 MG: 20 INJECTION, SOLUTION INTRAMUSCULAR; INTRAVENOUS at 07:44

## 2020-12-08 RX ADMIN — ROCURONIUM BROMIDE 25 MG: 10 SOLUTION INTRAVENOUS at 07:50

## 2020-12-08 RX ADMIN — ROCURONIUM BROMIDE 5 MG: 10 SOLUTION INTRAVENOUS at 07:44

## 2020-12-08 RX ADMIN — ACETAMINOPHEN 650 MG: 325 TABLET ORAL at 07:02

## 2020-12-08 RX ADMIN — KETOROLAC TROMETHAMINE 30 MG: 30 INJECTION, SOLUTION INTRAMUSCULAR; INTRAVENOUS at 08:39

## 2020-12-08 RX ADMIN — DEXAMETHASONE SODIUM PHOSPHATE 8 MG: 4 INJECTION, SOLUTION INTRAMUSCULAR; INTRAVENOUS at 07:56

## 2020-12-08 RX ADMIN — SODIUM CHLORIDE, SODIUM LACTATE, POTASSIUM CHLORIDE, AND CALCIUM CHLORIDE 1000 ML: 600; 310; 30; 20 INJECTION, SOLUTION INTRAVENOUS at 07:02

## 2020-12-08 RX ADMIN — SCOPALAMINE 1 PATCH: 1 PATCH, EXTENDED RELEASE TRANSDERMAL at 07:32

## 2020-12-08 RX ADMIN — FENTANYL CITRATE 50 MCG: 50 INJECTION, SOLUTION INTRAMUSCULAR; INTRAVENOUS at 07:55

## 2020-12-08 RX ADMIN — MIDAZOLAM HYDROCHLORIDE 5 MG: 1 INJECTION, SOLUTION INTRAMUSCULAR; INTRAVENOUS at 07:32

## 2020-12-08 RX ADMIN — LIDOCAINE HYDROCHLORIDE 60 MG: 20 INJECTION, SOLUTION EPIDURAL; INFILTRATION; INTRACAUDAL; PERINEURAL at 07:44

## 2020-12-08 NOTE — BRIEF OP NOTE
Brief Postoperative Note    Patient: Marcelo Linares  YOB: 1990  MRN: [id number]    Date of Procedure: 12/8/2020     Pre-Op Diagnosis: PILONIDAL CYST    Post-Op Diagnosis: Same as preoperative diagnosis.       Procedure(s):  EXCISION PILONIDAL CYST    Surgeon(s):  Zora Davila MD    Surgical Assistant: Surg Asst-1: Vinita Jensen    Anesthesia: General     Estimated Blood Loss (mL): less than 50     Complications: None    Specimens:   ID Type Source Tests Collected by Time Destination   1 : pilonidal cyst Fresh Cyst  Zora Davila MD 12/8/2020 3348 Pathology        Implants: * No implants in log *    Drains: * No LDAs found *    Findings: excision of pilonidal cyst 2x6cm    Electronically Signed by Gina Lai MD on 12/8/2020 at 8:56 AM

## 2020-12-08 NOTE — ANESTHESIA POSTPROCEDURE EVALUATION
Post-Anesthesia Evaluation and Assessment    Patient: Gigi Evans MRN: [id number]  SSN: [id number]    YOB: 1990  Age: 27 y.o. Sex: female      I have evaluated the patient and they are stable and ready for discharge from the PACU. Cardiovascular Function/Vital Signs  Visit Vitals  /60   Pulse 74   Temp 36.4 °C (97.5 °F)   Resp 13   Ht 5' 4\" (1.626 m)   Wt 73.9 kg (162 lb 14.7 oz)   SpO2 99%   BMI 27.97 kg/m²       Patient is status post General anesthesia for Procedure(s):  EXCISION PILONIDAL CYST. Nausea/Vomiting: None    Postoperative hydration reviewed and adequate. Pain:  Pain Scale 1: Numeric (0 - 10) (12/08/20 0910)  Pain Intensity 1: 0 (12/08/20 0910)   Managed    Neurological Status:   Neuro (WDL): Within Defined Limits (12/08/20 0910)   At baseline    Mental Status, Level of Consciousness: Alert and  oriented to person, place, and time    Pulmonary Status:   O2 Device: Room air (12/08/20 0910)   Adequate oxygenation and airway patent    Complications related to anesthesia: None    Post-anesthesia assessment completed. No concerns    Signed By: Britney Jaimes MD     December 8, 2020              Procedure(s):  EXCISION PILONIDAL CYST.    general    <BSHSIANPOST>    INITIAL Post-op Vital signs:   Vitals Value Taken Time   /60 12/8/2020  9:15 AM   Temp 36.4 °C (97.5 °F) 12/8/2020  9:10 AM   Pulse 70 12/8/2020  9:11 AM   Resp 11 12/8/2020  9:18 AM   SpO2 98 % 12/8/2020  9:18 AM   Vitals shown include unvalidated device data.

## 2020-12-08 NOTE — H&P
[facility] Surgery History and Physical     History of Present Illness:      Ning Palacios is a 34 y.o. female who has a pilonidal cyst. she has had a cyst present for about 2 years now. She has had 3 infections of the cyst and incision and drainages. Her most recent one was about 3 weeks ago. She is now feeling much better and not having any pain or drainage from the wound.   She is now interested in getting the pilonidal cyst excised     No past medical history on file.           Past Surgical History:   Procedure Laterality Date    HX HEENT         lasik         No current outpatient medications on file.     No Known Allergies     Social History            Socioeconomic History    Marital status: SINGLE       Spouse name: Not on file    Number of children: Not on file    Years of education: Not on file    Highest education level: Not on file   Occupational History    Not on file   Social Needs    Financial resource strain: Not on file    Food insecurity       Worry: Not on file       Inability: Not on file    Transportation needs       Medical: Not on file       Non-medical: Not on file   Tobacco Use    Smoking status: Current Every Day Smoker    Smokeless tobacco: Never Used   Substance and Sexual Activity    Alcohol use: Not Currently    Drug use: Never    Sexual activity: Not on file   Lifestyle    Physical activity       Days per week: Not on file       Minutes per session: Not on file    Stress: Not on file   Relationships    Social connections       Talks on phone: Not on file       Gets together: Not on file       Attends Oriental orthodox service: Not on file       Active member of club or organization: Not on file       Attends meetings of clubs or organizations: Not on file       Relationship status: Not on file    Intimate partner violence       Fear of current or ex partner: Not on file       Emotionally abused: Not on file       Physically abused: Not on file       Forced sexual activity: Not on file   Other Topics Concern    Not on file   Social History Narrative    Not on file         No family history on file.     ROS   Constitutional: negative  Ears, Nose, Mouth, Throat, and Face: negative  Respiratory: negative  Cardiovascular: negative  Gastrointestinal: negative  Genitourinary:negative  Integument/Breast: Pilonidal cyst in the midline buttock  Hematologic/Lymphatic: negative  Behavioral/Psychiatric: negative  Allergic/Immunologic: negative        Physical Exam:      Visit Vitals  /73 (BP 1 Location: Left arm, BP Patient Position: Sitting)   Pulse 75   Temp 98.5 °F (36.9 °C) (Oral)   Resp 28   Ht 5' 4\" (1.626 m)   Wt 166 lb (75.3 kg)   SpO2 98%   BMI 28.49 kg/m²         General - alert and oriented, no apparent distress  HEENT - no jaundice, no hearing imparement  Pulm - CTAB, no C/W/R  CV - RRR, no M/R/G  Abd -soft, nondistended  Buttock-upper midline buttock with small pits of pilonidal cyst small healed incision on the right buttock just of midline no erythema no drainage  Ext - pulses intact in UE and LE bilaterally, no edema  Skin - supple, no rashes  Psychiatric - normal affect, good mood     Labs  None     Imaging  None  I have reviewed and agree with all of the pertinent images     Assessment:      Ning Palacios is a 34 y.o. female with pilonidal cyst     Recommendations:      1. She does have a pilonidal cyst and has had 3 infections of the cyst.  The infection has now cleared and there is appears to be no sign of infection currently. We will get her scheduled for pilonidal cystectomy in the OR. We will plan on doing this somewhere in the range of 2 to 4 weeks from now to give it just a little bit more time to heal.  I have discussed the above procedure with the patient in detail.   We reviewed the benefits and possible complications of the surgery which include bleeding, infection, damage to adjacent organs, venous thromboembolism, need for repeat surgery, death and other unforseen complications.   The patient agreed to proceed with the surgery.          Martha Spaulding MD

## 2020-12-08 NOTE — DISCHARGE INSTRUCTIONS
Excision of cyst or soft tissue mass      Instructions Following Excision of Pilonidal Cyst    Activity  · As tolerated but avoid heavy lifting over 10lbs for the next 2 weeks if possible. You may sit on a pillow or donut to keep pressure off the wound     Diet  · Clear liquids until no nausea or vomiting; then light diet for the first day  · Advance to regular diet on second day, unless your doctor orders otherwise  · If nausea and vomiting continues, call your doctor    Pain  · Take pain medication as directed by your doctor  ·  Call your doctor if pain is NOT relieved by medication  · DO NOT take aspirin or blood thinners until directed by your doctor    Dressing Care: Keep the dressing from surgery in place for the next 48 hours. You may then remove it and then apply bacitracin ointment to the wound daily at home and then keep the wound covered with tape a gauze each day for at least one week after surgery. You may get it wet in the shower in 48 hours. Do not submerge it in a bath till the sutures are removed    Follow-Up Phone Calls  · Call will be made nursing staff  · If you have any problems or concerns, call your doctor as needed    Call your doctor if you experience:  · Excessive bleeding that does not stop after holding mild pressure over the area  · Temperature of 101° Fahrenheit or above  · Redness, excessive swelling or bruising, and/or green or yellow, smelly discharge from incision    After Anesthesia  · For the first 24 hours: DO NOT drive, drink alcoholic beverages, or make important decisions  · Be aware of dizziness following anesthesia and while taking pain medication    Other Instructions: call the office if any increased pain, the wound opens. Expect some drainage from the wound for the first few days to a week after surgery    Appointment date/time: Follow-up with Dr. Maria Elena Crandall in 2 week(s). Call the office to schedule your appointment.     Your doctor's phone number: [phone]    ______________________________________________________________________    Anesthesia Discharge Instructions    After general anesthesia or intervenous sedation, for 24 hours or while taking prescription Narcotics:  · Limit your activities  · Do not drive or operate hazardous machinery  · If you have not urinated within 8 hours after discharge, please contact your surgeon on call. · Do not make important personal or business decisions  · Do not drink alcoholic beverages    Report the following to your surgeon:  · Excessive pain, swelling, redness or odor of or around the surgical area  · Temperature over 100.5 degrees  · Nausea and vomiting lasting longer than 4 hours or if unable to take medication  · Any signs of decreased circulation or nerve impairment to extremity:  Change in color, persistent numbness, tingling, coldness or increased pain.   · Any questions

## 2020-12-08 NOTE — ROUTINE PROCESS
Patient: Severo Evangelist MRN: [id number]  SSN: [id number]   YOB: 1990  Age: 27 y.o. Sex: female     Patient is status post Procedure(s):  EXCISION PILONIDAL CYST.     Surgeon(s) and Role:     Ioana Daniel MD - Primary    Local/Dose/Irrigation:                    Peripheral IV 12/08/20 Right Wrist (Active)            Airway - Endotracheal Tube 12/08/20 Oral (Active)                   Dressing/Packing:  Wound Anus Small incision closed with suture-Dressing/Treatment: ABD pad;Gauze dressing/dressing sponge;Tape/Soft cloth adhesive tape (12/08/20 0700)    Splint/Cast:  ]    Other:

## 2020-12-08 NOTE — OP NOTES
[address]   OPERATIVE REPORT    Name:  Michelle Valdez  MR#:  [id number]  :  1990  ACCOUNT #:  [de-identified]  DATE OF SERVICE:  2020      PREOPERATIVE DIAGNOSIS:  Pilonidal cyst.    POSTOPERATIVE DIAGNOSIS:  Pilonidal cyst.    PROCEDURE PERFORMED:  EXCISION OF PILONIDAL CYST. SURGEON:  Paddy Neal MD    ASSISTANT:  Sara Malone SA    ANESTHESIA:  General.    COMPLICATIONS:  None. SPECIMENS REMOVED:  Pilonidal cyst.    IMPLANTS:  None. ESTIMATED BLOOD LOSS:  Less than 50 mL. DRAINS:  None. FINDINGS:  Excision of pilonidal cyst 2 x 6 cm. INDICATION FOR THE OPERATION:  The patient is a 80-year-old female who has a history of pilonidal cyst, status post I and D a while ago, who has the cyst healed now and is ready for excision of the pilonidal cyst in the OR. DESCRIPTION OF THE OPERATION:  The patient was met in the preoperative holding area. The H and P was updated. Consent was signed. All risks and benefits were explained to the patient prior to the start of the surgery. She was taken back to the operating room. She was placed supine and then intubated and sedated and then placed into a prone position with the buttock area prepped and draped in standard sterile fashion. Time-out was called. Antibiotics were given. SCDs were on lower extremities. We started the operation by making an elliptical 6 x 2 cm incision dissecting around the subcutaneous tissue and the skin of the pilonidal cyst in the upper midline buttock area. We encompassed the entire cyst and cyst cavity, all of it had been decompressed and was not infected at the time of surgery. We removed the pilonidal cyst from the soft tissue in its entirety getting down into the deeper soft tissue but not getting down to the area of the pubic bone or pelvic bones. We removed our specimen, it measured 6 x 2 cm. We then cauterized any bleeding from the wound, it was all hemostatic.   There did not appear to be any pilonidal cyst cavity left over, we had removed it all. We then made sure everything was cauterized and there was no bleeding. We irrigated the wound with saline irrigation and then closed the deep soft tissue layers with multiple interrupted 2-0 Vicryl sutures and then closed the deep dermal layer with multiple interrupted 3-0 Vicryl sutures and then closed the skin with multiple interrupted 2-0 nylon sutures in a vertical mattress fashion, applied bacitracin to the wound with a dry sterile dressing over top. We then completed the operation. Dr. Jadiel Navarrete was present and scrubbed during the entire operation. The counts were correct.       Cande Clemente MD      [id number]  D:  12/08/2020 9:05  T:  12/08/2020 10:49  JOB #:  [id number]

## 2020-12-15 ENCOUNTER — VIRTUAL VISIT (OUTPATIENT)
Dept: INTERNAL MEDICINE CLINIC | Age: 30
End: 2020-12-15
Payer: COMMERCIAL

## 2020-12-15 DIAGNOSIS — F32.A DEPRESSION, UNSPECIFIED DEPRESSION TYPE: Primary | ICD-10-CM

## 2020-12-15 PROCEDURE — 99203 OFFICE O/P NEW LOW 30 MIN: CPT | Performed by: INTERNAL MEDICINE

## 2020-12-15 RX ORDER — SERTRALINE HYDROCHLORIDE 50 MG/1
50 TABLET, FILM COATED ORAL DAILY
Qty: 30 TAB | Refills: 5 | Status: SHIPPED | OUTPATIENT
Start: 2020-12-15

## 2020-12-15 NOTE — PATIENT INSTRUCTIONS
Office Policies Phone calls/patient messages: Please allow up to 24 hours for someone in the office to contact you about your call or message. Be mindful your provider may be out of the office or your message may require further review. We encourage you to use "[employer] for your messages as this is a faster, more efficient way to communicate with our office Medication Refills: 
         
Prescription medications require 48-72 business hours to process. We encourage you to use "[employer] for your refills. For controlled medications: Please allow 72 business hours to process. Certain medications may require you to  a written prescription at our office. NO narcotic/controlled medications will be prescribed after 4pm Monday through Friday or on weekends Form/Paperwork Completion: 
         
Please note a $25 fee may incur for all paperwork for completed by our providers. We ask that you allow 7-10 business days. Pre-payment is due prior to picking up/faxing the completed form. You may also download your forms to "[employer] to have your doctor print off.

## 2020-12-15 NOTE — PROGRESS NOTES
Yaritza Sharpe is a 27 y.o. female who was seen by synchronous (real-time) audio-video technology on 12/15/2020 for Establish Care (new pt establishing care ) and Medication Evaluation (pt wants to disucss zoloft)        Progress Note     SUBJECTIVE:   Ms. Yaritza Sharpe is a 27 y.o. female who is here for follow up of routine medical issues. Chief Complaint   Patient presents with   [facility] Establish Care     new pt establishing care     Medication Evaluation     pt wants to disucss zoloft       She has been suffering \"paralyzing\" depression. \"I've been on zoloft before\"--this was helping. She has been off because couldn't get back into psychiatry. She has a therapist, Jocelyn Wetzel, at [facility]. She denies suicidal ideation. She just had pilonidal cyst surgery last week. At this time, she is otherwise doing well and has brought no other complaints to my attention today. For a list of the medical issues addressed today, see the assessment and plan below. PMH:   Past Medical History:   Diagnosis Date    Anxiety     Depression     Pilonidal cyst 2020       Past Surgical History:   Procedure Laterality Date    HX APPENDECTOMY  08/12/2020    HX CYST REMOVAL      cyst removed from tail bone    HX HEENT      lasik       All: She has No Known Allergies. Current Outpatient Medications   Medication Sig    oxyCODONE-acetaminophen (PERCOCET) 5-325 mg per tablet Take 1 Tab by mouth every four (4) hours as needed for Pain for up to 7 days. Max Daily Amount: 6 Tabs.  ondansetron (ZOFRAN ODT) 4 mg disintegrating tablet Take 1 Tab by mouth every eight (8) hours as needed for Nausea or Nausea or Vomiting. Indications: prevent nausea and vomiting after surgery     No current facility-administered medications for this visit. FH: Her family history includes Anxiety in her sister;  Arthritis in her mother; Bipolar Disorder in her sister; COPD in her father; Depression in her mother; Diabetes in her mother; Hypertension in her father; Thyroid Disease in her mother. 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 She reports that she has been smoking. She has never used smokeless tobacco. She reports previous alcohol use. She reports that she does not use drugs. \"Occasional EtOH. \"  She smokes about 1/2 ppd. ROS: See above; Complete ROS otherwise negative. OBJECTIVE:   Vitals: There were no vitals taken for this visit. Gen: Pleasant 27 y.o.  female in NAD. Lab Results   Component Value Date/Time    Sodium 136 08/12/2020 02:17 PM    Potassium 3.5 08/12/2020 02:17 PM    Chloride 106 08/12/2020 02:17 PM    CO2 19 (L) 08/12/2020 02:17 PM    Anion gap 11 08/12/2020 02:17 PM    Glucose 89 09/24/2020 09:14 AM    BUN 13 08/12/2020 02:17 PM    Creatinine 0.76 08/12/2020 02:17 PM    BUN/Creatinine ratio 17 08/12/2020 02:17 PM    GFR est AA >60 08/12/2020 02:17 PM    GFR est non-AA >60 08/12/2020 02:17 PM    Calcium 10.7 (H) 08/12/2020 02:17 PM    Bilirubin, total 0.5 08/12/2020 02:17 PM    ALT (SGPT) 19 08/12/2020 02:17 PM    Alk. phosphatase 85 08/12/2020 02:17 PM    Protein, total 8.8 (H) 08/12/2020 02:17 PM    Albumin 4.6 08/12/2020 02:17 PM    Globulin 4.2 (H) 08/12/2020 02:17 PM    A-G Ratio 1.1 08/12/2020 02:17 PM       Lab Results   Component Value Date/Time    Cholesterol, total 190 09/24/2020 09:14 AM    HDL Cholesterol 64 09/24/2020 09:14 AM    LDL, calculated 115.6 (H) 09/24/2020 09:14 AM    Triglyceride 52 09/24/2020 09:14 AM       Lab Results   Component Value Date/Time    WBC 15.7 (H) 08/12/2020 02:17 PM    HGB 13.8 08/12/2020 02:17 PM    HCT 40.5 08/12/2020 02:17 PM    PLATELET 370 87/69/9358 02:17 PM    MCV 89.2 08/12/2020 02:17 PM         ASSESSMENT/ PLAN: Diagnoses and all orders for this visit:    1. Depression, unspecified depression type  -     sertraline (ZOLOFT) 50 mg tablet; Take 1 Tab by mouth daily.       Follow-up and Dispositions    · Return in about 6 weeks (around 1/26/2021) for depression. Objective:   No flowsheet data found. [INSTRUCTIONS:  \"[x]\" Indicates a positive item  \"[]\" Indicates a negative item  -- DELETE ALL ITEMS NOT EXAMINED]    Constitutional: [x] Appears well-developed and well-nourished [x] No apparent distress      [] Abnormal -     Mental status: [x] Alert and awake  [x] Oriented to person/place/time [x] Able to follow commands    [] Abnormal -     Eyes:   EOM    [x]  Normal    [] Abnormal -   Sclera  [x]  Normal    [] Abnormal -          Discharge [x]  None visible   [] Abnormal -     HENT: [x] Normocephalic, atraumatic  [] Abnormal -   [x] Mouth/Throat: Mucous membranes are moist    External Ears [x] Normal  [] Abnormal -    Neck: [x] No visualized mass [] Abnormal -     Pulmonary/Chest: [x] Respiratory effort normal   [x] No visualized signs of difficulty breathing or respiratory distress        [] Abnormal -      Musculoskeletal:   [x] Normal gait with no signs of ataxia         [x] Normal range of motion of neck        [] Abnormal -     Neurological:        [x] No Facial Asymmetry (Cranial nerve 7 motor function) (limited exam due to video visit)          [x] No gaze palsy        [] Abnormal -          Skin:        [x] No significant exanthematous lesions or discoloration noted on facial skin         [] Abnormal -            Psychiatric:       [x] Normal Affect [] Abnormal -       Other pertinent observable physical exam findings:-        We discussed the expected course, resolution and complications of the diagnosis(es) in detail. Medication risks, benefits, costs, interactions, and alternatives were discussed as indicated. I advised her to contact the office if her condition worsens, changes or fails to improve as anticipated. She expressed understanding with the diagnosis(es) and plan.        Jennifer Oakes, who was evaluated through a patient-initiated, synchronous (real-time) audio-video encounter, and/or her healthcare decision maker, is aware that it is a billable service, with coverage as determined by her insurance carrier. She provided verbal consent to proceed: YES, and patient identification was verified. It was conducted pursuant to the emergency declaration under the [address] [place] [address] authority and the [employer] Act. A caregiver was present when appropriate. Ability to conduct physical exam was limited. I was in office. The patient was at home or otherwise outside the office.       Mervat Mckeon MD

## 2020-12-23 ENCOUNTER — OFFICE VISIT (OUTPATIENT)
Dept: SURGERY | Age: 30
End: 2020-12-23
Payer: COMMERCIAL

## 2020-12-23 VITALS
TEMPERATURE: 99.1 F | HEIGHT: 64 IN | BODY MASS INDEX: 28.34 KG/M2 | RESPIRATION RATE: 20 BRPM | HEART RATE: 79 BPM | SYSTOLIC BLOOD PRESSURE: 110 MMHG | WEIGHT: 166 LBS | OXYGEN SATURATION: 97 % | DIASTOLIC BLOOD PRESSURE: 75 MMHG

## 2020-12-23 DIAGNOSIS — Z09 POSTOPERATIVE EXAMINATION: Primary | ICD-10-CM

## 2020-12-23 PROCEDURE — 99024 POSTOP FOLLOW-UP VISIT: CPT | Performed by: NURSE PRACTITIONER

## 2020-12-23 NOTE — PROGRESS NOTES
1. Have you been to the ER, urgent care clinic since your last visit? Hospitalized since your last visit? No    2. Have you seen or consulted any other health care providers outside of the [address] [place] [address] since your last visit? Include any pap smears or colon screening.  No

## 2020-12-23 NOTE — PROGRESS NOTES
Subjective:      Gerard Sahni is a 27 y.o. female who presents today for wound check. She is 2-week status post excision of pilonidal cyst.  She is feeling well. Pain is minimal.  She is back to work. Objective:     Visit Vitals  /75   Pulse 79   Temp 99.1 °F (37.3 °C)   Resp 20   Ht 5' 4\" (1.626 m)   Wt 166 lb (75.3 kg)   SpO2 97%   BMI 28.49 kg/m²       Wound:   Healing well, sutures intact. No erythema. Every other suture removed     Assessment:     Wound check. Plan:     1. Follow-up in 1 week. For remaining stitch removal.  2.  May keep area covered if needed. Pt verbalized understanding and questions were answered to the best of my knowledge and ability.

## 2020-12-23 NOTE — PATIENT INSTRUCTIONS
Pilonidal Cyst Excision: What to Expect at Home Your Recovery The amount of time it will take for you to heal depends on the way your surgery was done. If the cut (incision) was closed with stitches, it will probably take about 4 weeks to completely heal. If your incision is left open, it may take from a few weeks to several months to heal. After the incision has healed, you will have a scar where the cyst was removed. This will fade and become softer with time. Most people can go back to work and most activities after 2 to 4 weeks. Until you have completely healed, you will need to avoid strenuous exercise and activities that require long periods of sitting. This care sheet gives you a general idea about how long it will take for you to recover. But each person recovers at a different pace. Follow the steps below to get better as quickly as possible. How can you care for yourself at home? Activity 
  · Rest when you feel tired. Getting enough sleep will help you recover.  
  · Try to walk each day. Start by walking a little more than you did the day before. Bit by bit, increase the amount you walk. Walking boosts blood flow and helps prevent pneumonia and constipation.  
  · Shower as usual. Tereza Challenger the area around your incision dry with a towel when you are done. Avoid baths until the wound is completely healed. Keep the area dry and clean.  
  · Ask your doctor when you can drive again.  
  · Avoid sitting for a long time or sitting on hard surfaces until your incision has healed.  
  · Most people are able to return to work within 2 to 4 weeks after surgery. Diet 
  · You can eat your normal diet. If your stomach is upset, try bland, low-fat foods like plain rice, broiled chicken, toast, and yogurt.  
  · Drink plenty of fluids (unless your doctor tells you not to).   · You may notice that your bowel movements are not regular right after your surgery. This is common. Try to avoid constipation and straining with bowel movements. You may want to take a fiber supplement every day. If you have not had a bowel movement after a couple of days, ask your doctor about taking a mild laxative. Medicines 
  · Your doctor will tell you if and when you can restart your medicines. He or she will also give you instructions about taking any new medicines.  
  · If you take aspirin or some other blood thinner, ask your doctor if and when to start taking it again. Make sure that you understand exactly what your doctor wants you to do.  
  · Take pain medicines exactly as directed. ? If the doctor gave you a prescription medicine for pain, take it as prescribed. ? If you are not taking a prescription pain medicine, ask your doctor if you can take an over-the-counter medicine.  
  · If your doctor prescribed antibiotics, take them as directed. Do not stop taking them just because you feel better. You need to take the full course of antibiotics.  
  · If you think your pain medicine is making you sick to your stomach: 
? Take your medicine after meals (unless your doctor has told you not to). ? Ask your doctor for a different pain medicine. Incision care 
  · If your incision was closed with stitches: 
? Wash the area daily with warm, soapy water and pat it dry. Don't use hydrogen peroxide or alcohol, which can slow healing. ? You may cover the area with a gauze bandage if it weeps or rubs against clothing. Change the bandage every day. ? Keep the area clean and dry.  
  · If your incision was left open to heal, change the bandage, called a dressing, as instructed by your doctor. ? Dressing changes may hurt at first. Taking pain medicine about half an hour before you change the dressing can help. ? If your dressing sticks to your wound, try soaking the dressing in warm water for about 10 minutes before you remove it. You can do this in the shower or by placing a wet washcloth over the dressing. ? You may notice greenish gray fluid from your wound as you start to heal. This is normal. It is a sign that your wound is healing. Other instructions 
  · Use a doughnut cushion if sitting is uncomfortable.  
  · Keep the area around your wound free from hair. Ask your doctor what method of hair removal will work best.  
Follow-up care is a key part of your treatment and safety. Be sure to make and go to all appointments, and call your doctor if you are having problems. It's also a good idea to know your test results and keep a list of the medicines you take. When should you call for help? Call 911 anytime you think you may need emergency care. For example, call if: 
  · You passed out (lost consciousness).  
  · You have sudden chest pain and shortness of breath, or you cough up blood.  
  · You have severe belly pain. Call your doctor now or seek immediate medical care if: 
  · You have pain that does not get better after you take your pain medicine.  
  · Your incision was closed with stitches and the stitches come loose, or your incision comes open.  
  · Bright red blood has soaked through the bandage over your incision.  
  · You have signs of infection, such as: 
? Increased pain, swelling, warmth, or redness. ? Red streaks leading from the incision. ? Pus draining from the incision. ? A fever.  
  · You have new or worse nausea or vomiting.  
  · You are too sick to your stomach to drink any fluids.  
  · You cannot keep down fluids. Watch closely for changes in your health, and be sure to contact your doctor if you have any problems. Where can you learn more? Go to http://www.[employer].com/ Enter C004 in the search box to learn more about \"Pilonidal Cyst Excision: What to Expect at Home. \" Current as of: July 2, 2020               Content Version: 12.6 © 7076-5300 [employer]. Care instructions adapted under license by [employer] (which disclaims liability or warranty for this information). If you have questions about a medical condition or this instruction, always ask your healthcare professional. [place] [address] any warranty or liability for your use of this information.

## 2021-01-06 ENCOUNTER — OFFICE VISIT (OUTPATIENT)
Dept: SURGERY | Age: 31
End: 2021-01-06
Payer: COMMERCIAL

## 2021-01-06 VITALS
WEIGHT: 168.2 LBS | OXYGEN SATURATION: 97 % | TEMPERATURE: 98.3 F | BODY MASS INDEX: 28.71 KG/M2 | HEART RATE: 94 BPM | HEIGHT: 64 IN | DIASTOLIC BLOOD PRESSURE: 76 MMHG | SYSTOLIC BLOOD PRESSURE: 116 MMHG | RESPIRATION RATE: 16 BRPM

## 2021-01-06 DIAGNOSIS — L05.91 PILONIDAL CYST: Primary | ICD-10-CM

## 2021-01-06 PROCEDURE — 99024 POSTOP FOLLOW-UP VISIT: CPT | Performed by: SURGERY

## 2021-01-06 NOTE — PROGRESS NOTES
Subjective:      Bridget Pagan is a 27 y.o. female presents for postop care 3 wk(s) following excision of pilonidal cyst.  She is doing well and half of her sutures were removed at her previous office visit. She is not having much of any pain other than some mild irritation in the buttock area    Pathology: Pilonidal cyst    Objective:     Visit Vitals  /76 (BP 1 Location: Left arm, BP Patient Position: Sitting)   Pulse 94   Temp 98.3 °F (36.8 °C) (Oral)   Resp 16   Ht 5' 4\" (1.626 m)   Wt 168 lb 3.2 oz (76.3 kg)   SpO2 97%   BMI 28.87 kg/m²       General:  alert, cooperative, no distress, appears stated age       Buttock incision:   healing well, no drainage, no erythema,  no seroma, no swelling, no dehiscence, incision well approximated     Assessment:     1. Bridget Pagan is a 27 y.o. female who is s/p excision of pilonidal cyst      Plan:     1. Pt is to increase activities as tolerated. .  2.  The wound is healing well and the sutures were removed from the wound. 3. Follow-up prn    Ms. Jess Murillo has a reminder for a \"due or due soon\" health maintenance. I have asked that she contact her primary care provider for follow-up on this health maintenance.

## 2021-01-06 NOTE — PROGRESS NOTES
1. Have you been to the ER, urgent care clinic since your last visit? Hospitalized since your last visit? No    2. Have you seen or consulted any other health care providers outside of the [address] [place] [address] since your last visit? Include any pap smears or colon screening.  No

## 2021-01-15 ENCOUNTER — TELEPHONE (OUTPATIENT)
Dept: SURGERY | Age: 31
End: 2021-01-15

## 2021-01-15 NOTE — TELEPHONE ENCOUNTER
Returned call to Ms Christian Mallory reviewed [facility]. She states when she wiped this morning there was a scant amount of blood. Patient had EXCISION OF PILONIDAL CYST on 12/8/20. Patient seen on 1/6/21 reviewed notes  The wound is healing well and the sutures were removed from the wound. She states she is at work. She had one of the NP look at it. She states it is superficial. It still is bleeding small amount. Patient states it isn't bad she just want to make sure if she should get another set of eyes on it. I advised to use baby wipes and wipe gently to area. I advised patient to keep an eye on the area over the weekend. If there isn't any change she is to call the office to schedule a follow up. She verbalized understanding.

## 2021-01-15 NOTE — TELEPHONE ENCOUNTER
Please call pt back. Pt stated that she used the restroom and when she wiped there was blood. Pt also stated that her wound might be open.     Pt is at work and if she does not  lvm

## 2021-01-25 ENCOUNTER — OFFICE VISIT (OUTPATIENT)
Dept: SURGERY | Age: 31
End: 2021-01-25
Payer: COMMERCIAL

## 2021-01-25 DIAGNOSIS — Z51.89 ENCOUNTER FOR WOUND CARE: Primary | ICD-10-CM

## 2021-01-25 PROCEDURE — 99024 POSTOP FOLLOW-UP VISIT: CPT | Performed by: NURSE PRACTITIONER

## 2021-01-25 NOTE — PROGRESS NOTES
Subjective:    Simon Rosa is a 27 y.o. female presents for wound care following pilonidal cysectomy. She is receiving wound care by self who reports no problems with wound care. The patient is not having any pain thus far until today and it is aching \"a little bit. \" doesn't know if it is related to the fact that she was sitting for the majority of the day yesterday. Pt reports wound was healed until last week when she noticed a small superficial opening in healed incision and tiny amount of blood. Yesterday,. There was a larger amount of yellow serous drainage in the morning and today, pt noticed some brownish drainage this morning and then a small amt of clear yellow drainage around lunch. No fevers. Objective:         Wound:  Location: gluteal cleft  dry, no drainage, open, good granulation tissue. approx 1 cm opening at inferior portion of incision with beefy red granulation tissue. approx 0.4 cm tunneling both superiorly and inferiorly. periwound skin intact, no erythema or induration        Assessment:     Wound check    Plan:     1. Wound care discussed. Incision reopened on its own accord. No s/s of infection. Pt to keep clean, wash with soap and water, pat dry. Hoping to avoid packing. Dr. Buffy Mendoza aware. 2. Pt is to increase activities as tolerated. .  3. Follow-up in one month for wound check or sooner if need be. Ms. Nav Green has a reminder for a \"due or due soon\" health maintenance. I have asked that she contact her primary care provider for follow-up on this health maintenance. Patient verbalized understanding and agreement.

## 2021-01-25 NOTE — PROGRESS NOTES
1. Have you been to the ER, urgent care clinic since your last visit? Hospitalized since your last visit? No    2. Have you seen or consulted any other health care providers outside of the [address] [place] [address] since your last visit? Include any pap smears or colon screening.  No

## 2021-10-07 ENCOUNTER — OFFICE VISIT (OUTPATIENT)
Dept: SURGERY | Age: 31
End: 2021-10-07
Payer: COMMERCIAL

## 2021-10-07 ENCOUNTER — DOCUMENTATION ONLY (OUTPATIENT)
Dept: SURGERY | Age: 31
End: 2021-10-07

## 2021-10-07 VITALS
TEMPERATURE: 99.8 F | HEART RATE: 79 BPM | DIASTOLIC BLOOD PRESSURE: 70 MMHG | BODY MASS INDEX: 27.14 KG/M2 | OXYGEN SATURATION: 96 % | WEIGHT: 159 LBS | SYSTOLIC BLOOD PRESSURE: 103 MMHG | RESPIRATION RATE: 20 BRPM | HEIGHT: 64 IN

## 2021-10-07 DIAGNOSIS — L05.91 PILONIDAL CYST: Primary | ICD-10-CM

## 2021-10-07 PROCEDURE — 99212 OFFICE O/P EST SF 10 MIN: CPT | Performed by: NURSE PRACTITIONER

## 2021-10-07 RX ORDER — CEPHALEXIN 500 MG/1
500 CAPSULE ORAL 4 TIMES DAILY
Qty: 40 CAPSULE | Refills: 0 | Status: SHIPPED | OUTPATIENT
Start: 2021-10-07 | End: 2021-10-17

## 2021-10-07 NOTE — PROGRESS NOTES
Chief Complaint   Patient presents with    Follow-up     pilonidal cyst     Nenita Dyer is about 10-month status post pilonidal cystectomy. She said she was doing fine and no issues up until about a week ago when she started having some tenderness in the area and a little bit of redness. She said no spontaneous drainage. No fever, chills. She says she has been sitting more and that is been really the only change. She is not doing any new exercise and avoid staying in wet clothing. She has been doing some warm compresses to the area. She is concerned that the pilonidal cyst has recurred. Visit Vitals  /70   Pulse 79   Temp 99.8 °F (37.7 °C)   Resp 20   Ht 5' 4\" (1.626 m)   Wt 159 lb (72.1 kg)   SpO2 96%   BMI 27.29 kg/m²     A + O x 3  Gluteal cleft incision is well-healed and scarred, minimal erythema no induration. Minimal tenderness in the area and a slight fluctuance      ICD-10-CM ICD-9-CM    1. Pilonidal cyst  L05.91 685. 1      10-month status post excision of a pilonidal cyst now with some tenderness in the area  There is no drainage and a little bit of fluctuance in the area but nothing that appeared to be ready to be drained at this point  Did go ahead and give her a prescription for Keflex 500 mg 1 p.o. 4 times daily for 10 days that she may start certainly if the redness increases or she starts to have spontaneous drainage.   She may need I&D moving forward I did offer an ultrasound but we can also watch and wait  She is to follow-up with Dr. Primo Brown certainly if it does not improve, she has increased tenderness and/or fevers  She agreed with the plan and was appreciative of the care

## 2021-10-07 NOTE — PROGRESS NOTES
1. Have you been to the ER, urgent care clinic since your last visit? Hospitalized since your last visit? No    2. Have you seen or consulted any other health care providers outside of the [address] [place] [address] since your last visit? Include any pap smears or colon screening.  No

## 2021-10-07 NOTE — PATIENT INSTRUCTIONS
Take the antibiotics until gone     Keflex 1 tab 4x/ day     Avoid prolonged sitting     If fevers, spontaneous drainage or increased pain / redness please follow up with Chanell Carlos MD

## 2022-03-19 PROBLEM — K37 APPENDICITIS: Status: ACTIVE | Noted: 2020-08-12

## 2022-03-19 PROBLEM — K35.30 ACUTE APPENDICITIS WITH LOCALIZED PERITONITIS, WITHOUT PERFORATION, ABSCESS, OR GANGRENE: Status: ACTIVE | Noted: 2020-08-12

## (undated) DEVICE — SOLUTION IV 1000ML 0.9% SOD CHL

## (undated) DEVICE — STERILE POLYISOPRENE POWDER-FREE SURGICAL GLOVES WITH EMOLLIENT COATING: Brand: PROTEXIS

## (undated) DEVICE — STRAP,POSITIONING,KNEE/BODY,FOAM,4X60": Brand: MEDLINE

## (undated) DEVICE — NEEDLE SPNL 22GA L3.5IN BLK HUB S STL REG WALL FIT STYL W/

## (undated) DEVICE — REM POLYHESIVE ADULT PATIENT RETURN ELECTRODE: Brand: VALLEYLAB

## (undated) DEVICE — SUTURE MCRYL 3-0 L27IN ABSRB VLT SH L26MM 1/2 CIR Y316H

## (undated) DEVICE — TROCAR: Brand: KII FIOS FIRST ENTRY

## (undated) DEVICE — SYR 10ML LUER LOK 1/5ML GRAD --

## (undated) DEVICE — TOWEL SURG W17XL27IN STD BLU COT NONFENESTRATED PREWASHED

## (undated) DEVICE — NEEDLE HYPO 25GA L1.5IN BVL ORIENTED ECLIPSE

## (undated) DEVICE — PREP SKN CHLRAPRP APL 26ML STR --

## (undated) DEVICE — INSUFFLATION NEEDLE TO ESTABLISH PNEUMOPERITONEUM.: Brand: INSUFFLATION NEEDLE

## (undated) DEVICE — SUTURE MCRYL SZ 4-0 L27IN ABSRB UD L19MM PS-2 1/2 CIR PRIM Y426H

## (undated) DEVICE — NEEDLE HYPO 22GA L1.5IN BLK S STL HUB POLYPR SHLD REG BVL

## (undated) DEVICE — SUT ETHLN 2-0 18IN FS BLK --

## (undated) DEVICE — BAG SPEC REM 224ML W4XL6IN DIA10MM 1 HND GYN DISP ENDOPCH

## (undated) DEVICE — STERILE POLYISOPRENE POWDER-FREE SURGICAL GLOVES: Brand: PROTEXIS

## (undated) DEVICE — COVER LT HNDL PLAS RIG 1 PER PK

## (undated) DEVICE — SUTURE SZ 0 27IN 5/8 CIR UR-6  TAPER PT VIOLET ABSRB VICRYL J603H

## (undated) DEVICE — SUTURE VCRL SZ 3-0 L27IN ABSRB UD L26MM SH 1/2 CIR J416H

## (undated) DEVICE — SUTURE VCRL SZ 2-0 L27IN ABSRB UD L26MM SH 1/2 CIR J417H

## (undated) DEVICE — RELOAD STPL L45MM H1-2.6MM MESENTERY THN TISS WHT GRIPPING

## (undated) DEVICE — INFECTION CONTROL KIT SYS

## (undated) DEVICE — TROCAR: Brand: KII® SLEEVE

## (undated) DEVICE — LAPAROSCOPIC TROCAR SLEEVE/SINGLE USE: Brand: KII® OPTICAL ACCESS SYSTEM

## (undated) DEVICE — INTENDED FOR TISSUE SEPARATION, AND OTHER PROCEDURES THAT REQUIRE A SHARP SURGICAL BLADE TO PUNCTURE OR CUT.: Brand: BARD-PARKER ® CARBON RIB-BACK BLADES

## (undated) DEVICE — DBD-PACK,LAPAROTOMY,2 REINFORCED GOWNS: Brand: MEDLINE

## (undated) DEVICE — PILLOW OR POS AD L5IN R INTUB FOAM HDRST SLOT DISP GENTLE

## (undated) DEVICE — SUTURE ETHLN SZ 2-0 L18IN NONABSORBABLE BLK L26MM FS 3/8 664G

## (undated) DEVICE — RELOAD STPL L45MM H1.5-3.6MM REG TISS BLU GRIPPING SURF B

## (undated) DEVICE — GARMENT,MEDLINE,DVT,INT,CALF,MED, GEN2: Brand: MEDLINE

## (undated) DEVICE — PENCIL SMK EVAC 10 FT BLADE ELECTRD ROCKER FOR TELSCP

## (undated) DEVICE — SURGICAL PROCEDURE PACK BASIN MAJ SET CUST NO CAUT

## (undated) DEVICE — SURGICAL PROCEDURE KIT GEN LAPAROSCOPY LF

## (undated) DEVICE — TAPE,CLOTH/SILK,CURAD,3"X10YD,LF,40/CS: Brand: CURAD